# Patient Record
Sex: FEMALE | Race: WHITE | NOT HISPANIC OR LATINO
[De-identification: names, ages, dates, MRNs, and addresses within clinical notes are randomized per-mention and may not be internally consistent; named-entity substitution may affect disease eponyms.]

---

## 2018-07-12 ENCOUNTER — APPOINTMENT (OUTPATIENT)
Dept: SPINE | Facility: CLINIC | Age: 68
End: 2018-07-12
Payer: MEDICARE

## 2018-07-12 VITALS
WEIGHT: 170 LBS | SYSTOLIC BLOOD PRESSURE: 140 MMHG | DIASTOLIC BLOOD PRESSURE: 70 MMHG | HEIGHT: 62 IN | BODY MASS INDEX: 31.28 KG/M2

## 2018-07-12 DIAGNOSIS — M54.5 LOW BACK PAIN: ICD-10-CM

## 2018-07-12 DIAGNOSIS — M54.16 RADICULOPATHY, LUMBAR REGION: ICD-10-CM

## 2018-07-12 PROCEDURE — 99213 OFFICE O/P EST LOW 20 MIN: CPT

## 2018-08-16 ENCOUNTER — APPOINTMENT (OUTPATIENT)
Dept: SPINE | Facility: CLINIC | Age: 68
End: 2018-08-16

## 2018-10-31 ENCOUNTER — APPOINTMENT (OUTPATIENT)
Dept: HEART AND VASCULAR | Facility: CLINIC | Age: 68
End: 2018-10-31
Payer: MEDICARE

## 2018-10-31 VITALS — HEART RATE: 60 BPM

## 2018-10-31 DIAGNOSIS — Z87.39 PERSONAL HISTORY OF OTHER DISEASES OF THE MUSCULOSKELETAL SYSTEM AND CONNECTIVE TISSUE: ICD-10-CM

## 2018-10-31 DIAGNOSIS — Z82.5 FAMILY HISTORY OF ASTHMA AND OTHER CHRONIC LOWER RESPIRATORY DISEASES: ICD-10-CM

## 2018-10-31 DIAGNOSIS — I51.7 CARDIOMEGALY: ICD-10-CM

## 2018-10-31 DIAGNOSIS — F17.200 NICOTINE DEPENDENCE, UNSPECIFIED, UNCOMPLICATED: ICD-10-CM

## 2018-10-31 PROCEDURE — 93306 TTE W/DOPPLER COMPLETE: CPT

## 2018-10-31 PROCEDURE — 93000 ELECTROCARDIOGRAM COMPLETE: CPT

## 2018-10-31 PROCEDURE — 99204 OFFICE O/P NEW MOD 45 MIN: CPT | Mod: 25

## 2019-10-16 ENCOUNTER — APPOINTMENT (OUTPATIENT)
Dept: HEART AND VASCULAR | Facility: CLINIC | Age: 69
End: 2019-10-16
Payer: MEDICARE

## 2019-10-16 VITALS
DIASTOLIC BLOOD PRESSURE: 70 MMHG | WEIGHT: 170 LBS | SYSTOLIC BLOOD PRESSURE: 130 MMHG | BODY MASS INDEX: 31.09 KG/M2 | HEART RATE: 64 BPM

## 2019-10-16 PROCEDURE — 93000 ELECTROCARDIOGRAM COMPLETE: CPT

## 2019-10-16 PROCEDURE — 99214 OFFICE O/P EST MOD 30 MIN: CPT | Mod: 25

## 2019-10-16 NOTE — DISCUSSION/SUMMARY
[FreeTextEntry1] : EKG:NSR,ST abn(no change)\par reviewed labs done Sept(non fasting): SR=599ku%,LDL=70mg%\par dm f/u with PCP; cont Zocor for lipids;stop smoking!;\par cont Altace+HCTZ for hptn

## 2019-10-16 NOTE — REVIEW OF SYSTEMS
[Fever] : no fever [Headache] : no headache [Recent Weight Gain (___ Lbs)] : no recent weight gain [Chills] : no chills [Feeling Fatigued] : not feeling fatigued [Recent Weight Loss (___ Lbs)] : no recent weight loss [Blurry Vision] : no blurred vision [Eyeglasses] : currently wearing eyeglasses [Scratchy Eyes] : scratchy eyes [Shortness Of Breath] : no shortness of breath [Dyspnea on exertion] : not dyspnea during exertion [Chest  Pressure] : no chest pressure [Chest Pain] : no chest pain [Lower Ext Edema] : no extremity edema [Leg Claudication] : no intermittent leg claudication [Palpitations] : no palpitations [Cough] : no cough [Wheezing] : wheezing [Dysuria] : no dysuria [see HPI] : see HPI [Joint Pain] : joint pain [Knee Problem] : knee problems [Negative] : Heme/Lymph

## 2021-08-04 ENCOUNTER — OUTPATIENT (OUTPATIENT)
Dept: OUTPATIENT SERVICES | Facility: HOSPITAL | Age: 71
LOS: 1 days | End: 2021-08-04
Payer: MEDICARE

## 2021-08-04 VITALS
WEIGHT: 151.02 LBS | RESPIRATION RATE: 18 BRPM | DIASTOLIC BLOOD PRESSURE: 78 MMHG | HEIGHT: 61 IN | SYSTOLIC BLOOD PRESSURE: 162 MMHG | OXYGEN SATURATION: 96 % | HEART RATE: 52 BPM | TEMPERATURE: 98 F

## 2021-08-04 DIAGNOSIS — Z01.818 ENCOUNTER FOR OTHER PREPROCEDURAL EXAMINATION: ICD-10-CM

## 2021-08-04 DIAGNOSIS — E11.9 TYPE 2 DIABETES MELLITUS WITHOUT COMPLICATIONS: ICD-10-CM

## 2021-08-04 DIAGNOSIS — Z98.890 OTHER SPECIFIED POSTPROCEDURAL STATES: Chronic | ICD-10-CM

## 2021-08-04 DIAGNOSIS — N20.0 CALCULUS OF KIDNEY: ICD-10-CM

## 2021-08-04 DIAGNOSIS — I10 ESSENTIAL (PRIMARY) HYPERTENSION: ICD-10-CM

## 2021-08-04 DIAGNOSIS — Z98.89 OTHER SPECIFIED POSTPROCEDURAL STATES: Chronic | ICD-10-CM

## 2021-08-04 DIAGNOSIS — Z98.41 CATARACT EXTRACTION STATUS, RIGHT EYE: Chronic | ICD-10-CM

## 2021-08-04 LAB
A1C WITH ESTIMATED AVERAGE GLUCOSE RESULT: 6.4 % — HIGH (ref 4–5.6)
ANION GAP SERPL CALC-SCNC: 10 MMOL/L — SIGNIFICANT CHANGE UP (ref 5–17)
BUN SERPL-MCNC: 13 MG/DL — SIGNIFICANT CHANGE UP (ref 7–23)
CALCIUM SERPL-MCNC: 9.8 MG/DL — SIGNIFICANT CHANGE UP (ref 8.4–10.5)
CHLORIDE SERPL-SCNC: 101 MMOL/L — SIGNIFICANT CHANGE UP (ref 96–108)
CO2 SERPL-SCNC: 27 MMOL/L — SIGNIFICANT CHANGE UP (ref 22–31)
CREAT SERPL-MCNC: 0.55 MG/DL — SIGNIFICANT CHANGE UP (ref 0.5–1.3)
ESTIMATED AVERAGE GLUCOSE: 137 MG/DL — HIGH (ref 68–114)
GLUCOSE SERPL-MCNC: 74 MG/DL — SIGNIFICANT CHANGE UP (ref 70–99)
HCT VFR BLD CALC: 43.7 % — SIGNIFICANT CHANGE UP (ref 34.5–45)
HGB BLD-MCNC: 13.6 G/DL — SIGNIFICANT CHANGE UP (ref 11.5–15.5)
MCHC RBC-ENTMCNC: 26.4 PG — LOW (ref 27–34)
MCHC RBC-ENTMCNC: 31.1 GM/DL — LOW (ref 32–36)
MCV RBC AUTO: 84.7 FL — SIGNIFICANT CHANGE UP (ref 80–100)
NRBC # BLD: 0 /100 WBCS — SIGNIFICANT CHANGE UP (ref 0–0)
PLATELET # BLD AUTO: 242 K/UL — SIGNIFICANT CHANGE UP (ref 150–400)
POTASSIUM SERPL-MCNC: 3.9 MMOL/L — SIGNIFICANT CHANGE UP (ref 3.5–5.3)
POTASSIUM SERPL-SCNC: 3.9 MMOL/L — SIGNIFICANT CHANGE UP (ref 3.5–5.3)
RBC # BLD: 5.16 M/UL — SIGNIFICANT CHANGE UP (ref 3.8–5.2)
RBC # FLD: 17.1 % — HIGH (ref 10.3–14.5)
SODIUM SERPL-SCNC: 138 MMOL/L — SIGNIFICANT CHANGE UP (ref 135–145)
WBC # BLD: 9.78 K/UL — SIGNIFICANT CHANGE UP (ref 3.8–10.5)
WBC # FLD AUTO: 9.78 K/UL — SIGNIFICANT CHANGE UP (ref 3.8–10.5)

## 2021-08-04 PROCEDURE — 87086 URINE CULTURE/COLONY COUNT: CPT

## 2021-08-04 PROCEDURE — 85027 COMPLETE CBC AUTOMATED: CPT

## 2021-08-04 PROCEDURE — G0463: CPT

## 2021-08-04 PROCEDURE — 83036 HEMOGLOBIN GLYCOSYLATED A1C: CPT

## 2021-08-04 PROCEDURE — 80048 BASIC METABOLIC PNL TOTAL CA: CPT

## 2021-08-04 RX ORDER — SODIUM CHLORIDE 9 MG/ML
3 INJECTION INTRAMUSCULAR; INTRAVENOUS; SUBCUTANEOUS EVERY 8 HOURS
Refills: 0 | Status: DISCONTINUED | OUTPATIENT
Start: 2021-08-11 | End: 2021-08-11

## 2021-08-04 RX ORDER — LIDOCAINE HCL 20 MG/ML
0.2 VIAL (ML) INJECTION ONCE
Refills: 0 | Status: DISCONTINUED | OUTPATIENT
Start: 2021-08-11 | End: 2021-08-26

## 2021-08-04 NOTE — H&P PST ADULT - NSICDXPROBLEM_GEN_ALL_CORE_FT
PROBLEM DIAGNOSES  Problem: Calculus of kidney  Assessment and Plan: Pt is scheduled to have a left ESWL on 8/11/21 with Dr. Morales  CBC, BMP, HGA1C, Urine Culture obtained and sent to lab  Pending medical evaluation    Problem: Type 2 diabetes mellitus  Assessment and Plan: Advised pt to hold metformin and glimepiride DOS. Last doses 8/10/21 in the morning.    Problem: Hypertension  Assessment and Plan: Advised pt to take anti-hypertensive medications and hydrochlorothiazide DOS       PROBLEM DIAGNOSES  Problem: Calculus of kidney  Assessment and Plan: Pt is scheduled to have a left ESWL on 8/11/21 with Dr. Morales  CBC, BMP, HGA1C, Urine Culture obtained and sent to lab  KUB Xray ordered DOS  Pending medical evaluation    Problem: Type 2 diabetes mellitus  Assessment and Plan: Advised pt to hold metformin and glimepiride DOS. Last doses 8/10/21 in the morning.    Problem: Hypertension  Assessment and Plan: Advised pt to take anti-hypertensive medications and hydrochlorothiazide DOS       PROBLEM DIAGNOSES  Problem: Calculus of kidney  Assessment and Plan: Pt is scheduled to have a left ESWL on 8/11/21 with Dr. Morales  CBC, BMP, HGA1C, Urine Culture obtained and sent to lab  KUB Xray ordered DOS  Check fingerstick DOS   Pending medical evaluationon     Problem: Type 2 diabetes mellitus  Assessment and Plan: Advised pt to hold metformin and glimepiride DOS. Last doses 8/10/21 in the morning.    Problem: Hypertension  Assessment and Plan: Advised pt to take anti-hypertensive medications and hydrochlorothiazide DOS

## 2021-08-04 NOTE — H&P PST ADULT - NSICDXPASTMEDICALHX_GEN_ALL_CORE_FT
PAST MEDICAL HISTORY:  Depression     Diabetes mellitus Type 2 (on Metformin and Glimepiride)    Herniated lumbar intervertebral disc     HTN (hypertension)     Hyperlipidemia     Paralytic ileus 3-4 years ago, spontaneous - hospitalized with NGT for decompression; resolved on it owns own    Renal calculi     Spinal stenosis s/p "back surgery"

## 2021-08-04 NOTE — H&P PST ADULT - VISION (WITH CORRECTIVE LENSES IF THE PATIENT USUALLY WEARS THEM):
Bifocal glasses/Partially impaired: cannot see medication labels or newsprint, but can see obstacles in path, and the surrounding layout; can count fingers at arm's length

## 2021-08-04 NOTE — H&P PST ADULT - NSICDXPASTSURGICALHX_GEN_ALL_CORE_FT
PAST SURGICAL HISTORY:  H/O arthroscopy of left knee ~15 years ago; Repair of the left meniscus    H/O bilateral cataract extraction ~5 years ago    H/O cystoscopy Pt reports hx of cystoscopy with stent placement "years ago"    Previous back surgery as per patient, "they shaved a bone in my back." ~4-5 years ago with Dr. Gracia

## 2021-08-04 NOTE — H&P PST ADULT - LIVES WITH, PROFILE
Denies known Latex allergy or symptoms of Latex sensitivity  Tobacco use verified.  Medications verified, no changes.      Huong CAMPUZANO Mike is a 69 year old female presenting for a follow up for UCTD, last office visit was 6/8/2020.    - no concerns  - last eye exam June 2020         alone

## 2021-08-04 NOTE — H&P PST ADULT - HISTORY OF PRESENT ILLNESS
66 year old F with PMH of HTN, DM2 (On metformin BID & Glimepiride), current every day smoker (1 ppd) presented to PCP with c/o intermittent lower abdominal pain (not aggravated by eating) and left flank pain accompanied by nausea x 3 days. Pt reports she was sent for an abdominal sonogram which revealed a kidney stone. Pt presents to PST for scheduled left ESWL on 8/11/2021.    Covid vaccine 2nd dose Moderna 1/28/2021  Denies Recent travel, Exposure or Covid symptoms

## 2021-08-05 LAB
CULTURE RESULTS: SIGNIFICANT CHANGE UP
SPECIMEN SOURCE: SIGNIFICANT CHANGE UP

## 2021-08-09 ENCOUNTER — OUTPATIENT (OUTPATIENT)
Dept: OUTPATIENT SERVICES | Facility: HOSPITAL | Age: 71
LOS: 1 days | End: 2021-08-09
Payer: MEDICARE

## 2021-08-09 DIAGNOSIS — Z98.41 CATARACT EXTRACTION STATUS, RIGHT EYE: Chronic | ICD-10-CM

## 2021-08-09 DIAGNOSIS — Z98.89 OTHER SPECIFIED POSTPROCEDURAL STATES: Chronic | ICD-10-CM

## 2021-08-09 DIAGNOSIS — Z98.890 OTHER SPECIFIED POSTPROCEDURAL STATES: Chronic | ICD-10-CM

## 2021-08-09 DIAGNOSIS — Z11.52 ENCOUNTER FOR SCREENING FOR COVID-19: ICD-10-CM

## 2021-08-09 PROBLEM — N20.0 CALCULUS OF KIDNEY: Chronic | Status: ACTIVE | Noted: 2021-08-04

## 2021-08-09 PROBLEM — K56.0 PARALYTIC ILEUS: Chronic | Status: ACTIVE | Noted: 2021-08-04

## 2021-08-09 PROBLEM — E78.5 HYPERLIPIDEMIA, UNSPECIFIED: Chronic | Status: ACTIVE | Noted: 2021-08-04

## 2021-08-09 LAB — SARS-COV-2 RNA SPEC QL NAA+PROBE: SIGNIFICANT CHANGE UP

## 2021-08-10 ENCOUNTER — TRANSCRIPTION ENCOUNTER (OUTPATIENT)
Age: 71
End: 2021-08-10

## 2021-08-11 ENCOUNTER — TRANSCRIPTION ENCOUNTER (OUTPATIENT)
Age: 71
End: 2021-08-11

## 2021-08-11 ENCOUNTER — OUTPATIENT (OUTPATIENT)
Dept: INPATIENT UNIT | Facility: HOSPITAL | Age: 71
LOS: 1 days | End: 2021-08-11
Payer: MEDICARE

## 2021-08-11 VITALS
WEIGHT: 151.02 LBS | TEMPERATURE: 99 F | OXYGEN SATURATION: 99 % | SYSTOLIC BLOOD PRESSURE: 195 MMHG | RESPIRATION RATE: 18 BRPM | HEART RATE: 67 BPM | DIASTOLIC BLOOD PRESSURE: 74 MMHG | HEIGHT: 60.98 IN

## 2021-08-11 VITALS
HEART RATE: 65 BPM | RESPIRATION RATE: 17 BRPM | OXYGEN SATURATION: 95 % | DIASTOLIC BLOOD PRESSURE: 83 MMHG | SYSTOLIC BLOOD PRESSURE: 155 MMHG

## 2021-08-11 DIAGNOSIS — N20.0 CALCULUS OF KIDNEY: ICD-10-CM

## 2021-08-11 DIAGNOSIS — Z98.89 OTHER SPECIFIED POSTPROCEDURAL STATES: Chronic | ICD-10-CM

## 2021-08-11 DIAGNOSIS — Z98.890 OTHER SPECIFIED POSTPROCEDURAL STATES: Chronic | ICD-10-CM

## 2021-08-11 DIAGNOSIS — Z98.41 CATARACT EXTRACTION STATUS, RIGHT EYE: Chronic | ICD-10-CM

## 2021-08-11 LAB — GLUCOSE BLDC GLUCOMTR-MCNC: 131 MG/DL — HIGH (ref 70–99)

## 2021-08-11 PROCEDURE — 74018 RADEX ABDOMEN 1 VIEW: CPT | Mod: 26

## 2021-08-11 PROCEDURE — U0003: CPT

## 2021-08-11 PROCEDURE — 82962 GLUCOSE BLOOD TEST: CPT

## 2021-08-11 PROCEDURE — U0005: CPT

## 2021-08-11 PROCEDURE — 74018 RADEX ABDOMEN 1 VIEW: CPT

## 2021-08-11 PROCEDURE — 76000 FLUOROSCOPY <1 HR PHYS/QHP: CPT

## 2021-08-11 PROCEDURE — C9803: CPT

## 2021-08-11 PROCEDURE — 50590 FRAGMENTING OF KIDNEY STONE: CPT | Mod: LT

## 2021-08-11 RX ORDER — FENTANYL CITRATE 50 UG/ML
25 INJECTION INTRAVENOUS
Refills: 0 | Status: DISCONTINUED | OUTPATIENT
Start: 2021-08-11 | End: 2021-08-11

## 2021-08-11 RX ORDER — SIMVASTATIN 20 MG/1
1 TABLET, FILM COATED ORAL
Qty: 0 | Refills: 0 | DISCHARGE

## 2021-08-11 RX ORDER — ONDANSETRON 8 MG/1
4 TABLET, FILM COATED ORAL ONCE
Refills: 0 | Status: DISCONTINUED | OUTPATIENT
Start: 2021-08-11 | End: 2021-08-11

## 2021-08-11 RX ORDER — SODIUM CHLORIDE 9 MG/ML
1000 INJECTION, SOLUTION INTRAVENOUS
Refills: 0 | Status: DISCONTINUED | OUTPATIENT
Start: 2021-08-11 | End: 2021-08-11

## 2021-08-11 NOTE — DISCHARGE NOTE NURSING/CASE MANAGEMENT/SOCIAL WORK - PATIENT PORTAL LINK FT
You can access the FollowMyHealth Patient Portal offered by [facility] by registering at the following website: [email or web address]. By joining [facility]’s FollowMyHealth portal, you will also be able to view your health information using other applications (apps) compatible with our system.

## 2021-08-11 NOTE — ASU DISCHARGE PLAN (ADULT/PEDIATRIC) - ASU DC SPECIAL INSTRUCTIONSFT
strain urine  lots lots of water  e rx to pharmacy for tamsulosin and ketorolac  call any questions  office follow up 4-6 weeks to check symptoms consider 2nd stage additional proceedure needed

## 2021-08-11 NOTE — PRE-ANESTHESIA EVALUATION ADULT - NSANTHVITALSIGNSFT_GEN_ALL_CORE
Vital Signs Last 24 Hrs  T(C): 37 (11 Aug 2021 13:16), Max: 37 (11 Aug 2021 13:16)  T(F): 98.6 (11 Aug 2021 13:16), Max: 98.6 (11 Aug 2021 13:16)  HR: 67 (11 Aug 2021 13:16) (67 - 67)  BP: 195/74 (11 Aug 2021 13:16) (195/74 - 195/74)  BP(mean): --  RR: 18 (11 Aug 2021 13:16) (18 - 18)  SpO2: 95% (11 Aug 2021 13:16) (95% - 95%)

## 2021-08-11 NOTE — PRE-ANESTHESIA EVALUATION ADULT - NSANTHPMHFT_GEN_ALL_CORE
71F PMH: HTN, HLD, CASSI ( Cpap- non-compliant) DM, Smoker 1 ppd. Patient denies: Angina & Dyspnea on rest or exertion, Denies Severe Regurgitation/Heart burn.

## 2022-07-25 NOTE — H&P PST ADULT - LYMPH NODES
[facility]   [address]   Phone: [phone]   Fax: [phone]    Physical Therapy Daily Treatment Note      Date:  2022  Patient Name:  Eunice Schuster \"Kiara\"   :  1951  MRN: [id number]  Physician: Heather Cantu MD                 Insurance: [employer]. Auth:  Eval + 12 visits through 22  Medical Diagnosis:   M54.42, M54.41, G89.29 (ICD-10-CM) - Chronic bilateral low back pain with bilateral sciatica   E66.01, Z68.41 (ICD-10-CM) - Morbid obesity with BMI of 40.0-44.9, adult ([facility] [place] [facility]   M47.817 (ICD-10-CM) - Lumbosacral spondylosis without myelopathy      Rehab Codes: M54.42, M54.41, R53.1, R26.9  Onset Date:                                Next 's appt: 22  Visit# / total visits:   Cancels/No Shows: 1/0    Precautions: Fall risk, requires SBA for safety for all standing tasks. Monitor SpO2 with exertion (pt has COPD and emphysema) and try to maintain at 90% or above through all activities. Subjective:    Pain:  [] Yes  [x] No Location:  Pain Rating: (0-10 scale) 0/10  Pain altered Tx:  [] No  [] Yes  Action:  Comments: : Patient presents to PT reporting high blood sugar levels the past 2 days. At home around 1 pm today her blood sugar was 572, in the waiting room it was 427, and at 3:32pm it was 363. She consumed toast, coffee, and lots of water around 10 am, and has not consumed any snacks since then. Her blood sugar decreased gradually because she took 3 fast acting insulin shots at home. She reports experiencing increased thirst, glossed vision, \"woozy\" feeling, and spots in vision. Pt reports she had difficulty maintaining healthy blood sugar levels the past 3 months, specifically since started her new medication, Trulicity. However her \"normal\" level is around 140. She had chest pain earlier this morning but did not take a Nitrol.  Her chest pain has gotten more severe lately, she took a Nitrol on Thursday night while playing cards. Pt also believes she has a bladder infection that began last week, however she has not sought any medical attention and is not taking antibiotics. Pt reports she has been more stressed lately, which may be contributing to her health. Objective:  INTERVENTIONS  Reps/ Time Weight/ Level Completed  Today Comments          SpO2/HR: SpO2: HR:     Beginnin% 80bpm     Durin-92%  bpm  Allowed pt to recover to 90% or above before proceeding to next exercise   End: 92% 80bpm            EXERCISES        Seated:       STS 2x5   1st set BUE support, 2nd set 1 UE support   Seated PPT 10x2 5\" hold      Seated Paloff Press 15x2 Green     LAQ 15x2 3\" hold      Marches 15x  Red  RLE only   Hip Adduction isometric with ball 20x 3\" hold      Hip Abduction 10x2 Red     Hamstring Curls 20x ea Red     PPT with alternating UE extensions 10x2  Red     PPT with alternating UE overhead reaches 10x2             Standing:       EO NBOS  30''      EC NBOS 30''x2      NBOS with head turns 2x30''      Modified Tandem stance 30''x2      Balloon taps 2'   CGA, other staff member tapping balloon   Cone retrieval with 2WW 100ft x 6cones   CGA, challenging change of COG   Mini squats 10x   No UE support   Marching on foam 30x   1UE support, CGA   Hamstring curls       Forward Step Ups 10x 6\"     3-Way Hip 15x ea             Patient education, vitals assessment, and monitoring pt sx 40' SpO2 93% and blood sugar 363 at 3:32pm x    Other:     Specific Instructions for next treatment Emphasis on functional, progressive B LE strengthening and endurance as appropriate. Incorporate both static and dynamic balance interventions as appropriate. Monitor exertion response and SpO2, adjusting intensity as needed. Assessment:       [x] Progressing toward goals. [] No change.      [] Other:    [x] Patient would continue to benefit from skilled physical therapy services in order to: help modulate pain and spasms in B legs, improve B LE strength and functional endurance necessary to maximize independence and safety through all ADL and community activities. 7/25: Held all exercise based interventions today as patient arrived with very high blood sugar levels and symptoms associated with it. Spent 40 minutes total assessing patient vitals, monitoring patient symptoms, educating patient and collaborating with EMS upon arrival. Educated patient on the importance of following up with medical team when she experiences unusual symptoms, continuing to assess her vitals regularly, and to keep resources to do so on her at all times. Discussed patient presentation with patient's PCP via phone and was advised by on-site physician that pt should go for immediate ED consult. Called 911 per physician instruction and pt was left in the hands of EMS for transport to [facility]. GOALS:   STG: (to be met in 6 treatments)  Pt will self report worst pain no greater than 3/10 in order to better tolerate ADLs/work activities with minimal dysfunction  Pt will complete FTSTS in <20 seconds with minimal UE support or less maintaining SpO2 at 90% or greater to show improved efficiency with functional transitions in home and reduced fall risk  LTG: (to be met in 12 treatments)  Pt will demonstrate improved B LE strength to 4/5 or greater in order to demonstrate improved stability/strength necessary for unrestricted ADLs/work activities  Pt will complete TUG in <20 seconds with 2WW maintaining SpO2 at 90% or greater to show improved safety and efficiency ambulating in home with reduced risk for falls  Pt will decrease mod SUSAN to 20% impaired or less in order to demonstrate improved functional tolerances at PLOF with minimal restriction/dysfunction  Pt will demonstrate independence with a long term HEP for continued progress/maintenance after completion of PT      Pt.  Education:  [] Yes  [x] No  [] Reviewed Prior HEP/Ed  Method of Education: [x] Verbal  [x] Demo  [] Written  Comprehension of Education:  [x] Verbalizes understanding. [x] Demonstrates understanding. [] Needs review. [] Demonstrates/verbalizes HEP/Ed previously given. Plan: [x] Continue per plan of care. [] Other:      Treatment Charges: Mins Units   []  Modalities     []  Ther Exercise     []  Manual Therapy     [x]  Ther Activities 15 1   []  Aquatics     []  Neuromuscular     [] Vasocompression     [] Gait Training     [] Dry needling        [] 1 or 2 muscles        [] 3 or more muscles     []  Other     Total Treatment time 15 1   *15 min billed for direct 1:1 assessment, monitoring and education.  Pt's remaining time in clinic was spent collaborating/communicating with EMS upon arrival.     Time In: 3:30 pm           Time Out: 4:10 pm    Electronically signed by:  TANIYA Sandoval  All above treatment interventions and documentation were performed by a student physical therapist (SPT) under the direct supervision of licensed provider Fela Martinez PT, DPT No lymphadedenopathy

## 2022-12-13 NOTE — ASU DISCHARGE PLAN (ADULT/PEDIATRIC) - CALL YOUR DOCTOR IF YOU HAVE ANY OF THE FOLLOWING:
Results and orders sent to [facility]. Office Yvonne Quinonez has been rescheduled to next week.    Bleeding that does not stop

## 2023-08-03 ENCOUNTER — NON-APPOINTMENT (OUTPATIENT)
Age: 73
End: 2023-08-03

## 2023-08-03 ENCOUNTER — APPOINTMENT (OUTPATIENT)
Dept: CARDIOLOGY | Facility: CLINIC | Age: 73
End: 2023-08-03
Payer: MEDICARE

## 2023-08-03 VITALS
SYSTOLIC BLOOD PRESSURE: 139 MMHG | OXYGEN SATURATION: 97 % | HEART RATE: 75 BPM | HEIGHT: 62 IN | BODY MASS INDEX: 26.61 KG/M2 | TEMPERATURE: 97.2 F | WEIGHT: 144.6 LBS | DIASTOLIC BLOOD PRESSURE: 57 MMHG

## 2023-08-03 PROCEDURE — 99205 OFFICE O/P NEW HI 60 MIN: CPT

## 2023-08-03 PROCEDURE — 93000 ELECTROCARDIOGRAM COMPLETE: CPT

## 2023-08-03 RX ORDER — ASPIRIN ENTERIC COATED TABLETS 81 MG 81 MG/1
81 TABLET, DELAYED RELEASE ORAL DAILY
Qty: 90 | Refills: 1 | Status: ACTIVE | COMMUNITY
Start: 2023-08-03 | End: 1900-01-01

## 2023-08-03 RX ORDER — LOSARTAN POTASSIUM 50 MG/1
50 TABLET, FILM COATED ORAL
Qty: 90 | Refills: 3 | Status: ACTIVE | COMMUNITY
Start: 2023-08-03 | End: 1900-01-01

## 2023-08-03 NOTE — HISTORY OF PRESENT ILLNESS
[FreeTextEntry1] : POLLO WATKINS 73 year F BMI 27 , presents with HTN HLD COPD centrally cyanosed abnormal EKG NYHA 2-3 dyspnea, occasional non-exertional chest pain, no palpitations, no syncope, no claudication, no peripheral edema.   Tobacco Active. Smoking cessation counselled. /57; HR 75 BPM. Echo lexiscan nuc for re=sting STT anomalies on EKG. RTC 6w. Rationalize meds stop simvastatin (age) ramipril, amlodipine, HCTZ for losartan 50/d, continue metoprolol 50/d, ASA  81. Total visit time 65min. Recent [facility] admit for BP control. 8/3/23 NSR 63/min TWI/ST depression V4-6

## 2023-08-03 NOTE — PHYSICAL EXAM

## 2023-08-04 ENCOUNTER — APPOINTMENT (OUTPATIENT)
Dept: CARDIOLOGY | Facility: CLINIC | Age: 73
End: 2023-08-04
Payer: MEDICARE

## 2023-08-04 PROCEDURE — 93306 TTE W/DOPPLER COMPLETE: CPT

## 2023-08-05 LAB
ALBUMIN SERPL ELPH-MCNC: 4.3 G/DL
ALP BLD-CCNC: 79 U/L
ALT SERPL-CCNC: 11 U/L
ANION GAP SERPL CALC-SCNC: 13 MMOL/L
AST SERPL-CCNC: 11 U/L
BILIRUB SERPL-MCNC: 0.2 MG/DL
BUN SERPL-MCNC: 17 MG/DL
CALCIUM SERPL-MCNC: 9.9 MG/DL
CHLORIDE SERPL-SCNC: 103 MMOL/L
CHOLEST SERPL-MCNC: 141 MG/DL
CO2 SERPL-SCNC: 27 MMOL/L
CREAT SERPL-MCNC: 0.54 MG/DL
EGFR: 96 ML/MIN/1.73M2
GLUCOSE SERPL-MCNC: 99 MG/DL
HDLC SERPL-MCNC: 35 MG/DL
LDLC SERPL CALC-MCNC: 73 MG/DL
NONHDLC SERPL-MCNC: 106 MG/DL
NT-PROBNP SERPL-MCNC: 231 PG/ML
POTASSIUM SERPL-SCNC: 4.4 MMOL/L
PROT SERPL-MCNC: 6.4 G/DL
SODIUM SERPL-SCNC: 143 MMOL/L
TRIGL SERPL-MCNC: 191 MG/DL

## 2023-08-10 ENCOUNTER — APPOINTMENT (OUTPATIENT)
Dept: CARDIOLOGY | Facility: CLINIC | Age: 73
End: 2023-08-10

## 2023-10-11 ENCOUNTER — APPOINTMENT (OUTPATIENT)
Dept: HEART AND VASCULAR | Facility: CLINIC | Age: 73
End: 2023-10-11

## 2023-10-11 ENCOUNTER — APPOINTMENT (OUTPATIENT)
Dept: CARDIOLOGY | Facility: CLINIC | Age: 73
End: 2023-10-11
Payer: MEDICARE

## 2023-10-11 VITALS
WEIGHT: 147 LBS | SYSTOLIC BLOOD PRESSURE: 124 MMHG | DIASTOLIC BLOOD PRESSURE: 67 MMHG | TEMPERATURE: 97.1 F | HEART RATE: 82 BPM | OXYGEN SATURATION: 93 % | BODY MASS INDEX: 27.05 KG/M2 | HEIGHT: 62 IN

## 2023-10-11 DIAGNOSIS — R94.31 ABNORMAL ELECTROCARDIOGRAM [ECG] [EKG]: ICD-10-CM

## 2023-10-11 DIAGNOSIS — R07.9 CHEST PAIN, UNSPECIFIED: ICD-10-CM

## 2023-10-11 PROCEDURE — 99214 OFFICE O/P EST MOD 30 MIN: CPT

## 2023-11-01 ENCOUNTER — APPOINTMENT (OUTPATIENT)
Dept: CARDIOLOGY | Facility: CLINIC | Age: 73
End: 2023-11-01
Payer: MEDICARE

## 2023-11-01 PROCEDURE — A9500: CPT

## 2023-11-01 PROCEDURE — 93015 CV STRESS TEST SUPVJ I&R: CPT

## 2023-11-01 PROCEDURE — 78452 HT MUSCLE IMAGE SPECT MULT: CPT

## 2023-11-08 ENCOUNTER — APPOINTMENT (OUTPATIENT)
Dept: CARDIOLOGY | Facility: CLINIC | Age: 73
End: 2023-11-08
Payer: MEDICARE

## 2023-11-08 VITALS
HEIGHT: 62 IN | TEMPERATURE: 97.3 F | HEART RATE: 73 BPM | DIASTOLIC BLOOD PRESSURE: 74 MMHG | BODY MASS INDEX: 27.23 KG/M2 | WEIGHT: 148 LBS | SYSTOLIC BLOOD PRESSURE: 162 MMHG | OXYGEN SATURATION: 94 %

## 2023-11-08 DIAGNOSIS — E78.2 MIXED HYPERLIPIDEMIA: ICD-10-CM

## 2023-11-08 DIAGNOSIS — E11.9 TYPE 2 DIABETES MELLITUS W/OUT COMPLICATIONS: ICD-10-CM

## 2023-11-08 DIAGNOSIS — I10 ESSENTIAL (PRIMARY) HYPERTENSION: ICD-10-CM

## 2023-11-08 PROCEDURE — 99214 OFFICE O/P EST MOD 30 MIN: CPT

## 2025-05-12 ENCOUNTER — INPATIENT (INPATIENT)
Facility: HOSPITAL | Age: 75
LOS: 3 days | Discharge: HOME | End: 2025-05-16
Attending: STUDENT IN AN ORGANIZED HEALTH CARE EDUCATION/TRAINING PROGRAM | Admitting: STUDENT IN AN ORGANIZED HEALTH CARE EDUCATION/TRAINING PROGRAM
Payer: MEDICARE

## 2025-05-12 ENCOUNTER — RESULT REVIEW (OUTPATIENT)
Age: 75
End: 2025-05-12

## 2025-05-12 VITALS
HEART RATE: 73 BPM | RESPIRATION RATE: 18 BRPM | TEMPERATURE: 99 F | HEIGHT: 62 IN | OXYGEN SATURATION: 100 % | WEIGHT: 138.89 LBS | SYSTOLIC BLOOD PRESSURE: 129 MMHG | DIASTOLIC BLOOD PRESSURE: 49 MMHG

## 2025-05-12 DIAGNOSIS — K80.50 CALCULUS OF BILE DUCT WITHOUT CHOLANGITIS OR CHOLECYSTITIS WITHOUT OBSTRUCTION: ICD-10-CM

## 2025-05-12 DIAGNOSIS — Z98.89 OTHER SPECIFIED POSTPROCEDURAL STATES: Chronic | ICD-10-CM

## 2025-05-12 DIAGNOSIS — Z98.890 OTHER SPECIFIED POSTPROCEDURAL STATES: Chronic | ICD-10-CM

## 2025-05-12 DIAGNOSIS — Z98.890 OTHER SPECIFIED POSTPROCEDURAL STATES: ICD-10-CM

## 2025-05-12 DIAGNOSIS — Z98.41 CATARACT EXTRACTION STATUS, RIGHT EYE: Chronic | ICD-10-CM

## 2025-05-12 LAB
A1C WITH ESTIMATED AVERAGE GLUCOSE RESULT: 6.4 % — HIGH (ref 4–5.6)
ALBUMIN SERPL ELPH-MCNC: 3.1 G/DL — LOW (ref 3.3–5)
ALP SERPL-CCNC: 732 U/L — HIGH (ref 40–120)
ALT FLD-CCNC: 185 U/L — HIGH (ref 4–33)
ANION GAP SERPL CALC-SCNC: 16 MMOL/L — HIGH (ref 7–14)
APTT BLD: 32.4 SEC — SIGNIFICANT CHANGE UP (ref 26.1–36.8)
AST SERPL-CCNC: 447 U/L — HIGH (ref 4–32)
BILIRUB DIRECT SERPL-MCNC: 1.4 MG/DL — HIGH (ref 0–0.3)
BILIRUB INDIRECT FLD-MCNC: 0.2 MG/DL — SIGNIFICANT CHANGE UP (ref 0–1)
BILIRUB SERPL-MCNC: 1.6 MG/DL — HIGH (ref 0.2–1.2)
BLD GP AB SCN SERPL QL: NEGATIVE — SIGNIFICANT CHANGE UP
BUN SERPL-MCNC: 26 MG/DL — HIGH (ref 7–23)
CALCIUM SERPL-MCNC: 8.1 MG/DL — LOW (ref 8.4–10.5)
CHLORIDE SERPL-SCNC: 100 MMOL/L — SIGNIFICANT CHANGE UP (ref 98–107)
CO2 SERPL-SCNC: 23 MMOL/L — SIGNIFICANT CHANGE UP (ref 22–31)
CREAT SERPL-MCNC: 1.43 MG/DL — HIGH (ref 0.5–1.3)
EGFR: 38 ML/MIN/1.73M2 — LOW
EGFR: 38 ML/MIN/1.73M2 — LOW
ESTIMATED AVERAGE GLUCOSE: 137 — SIGNIFICANT CHANGE UP
GLUCOSE BLDC GLUCOMTR-MCNC: 122 MG/DL — HIGH (ref 70–99)
GLUCOSE BLDC GLUCOMTR-MCNC: 189 MG/DL — HIGH (ref 70–99)
GLUCOSE BLDC GLUCOMTR-MCNC: 195 MG/DL — HIGH (ref 70–99)
GLUCOSE BLDC GLUCOMTR-MCNC: 89 MG/DL — SIGNIFICANT CHANGE UP (ref 70–99)
GLUCOSE SERPL-MCNC: 128 MG/DL — HIGH (ref 70–99)
HCT VFR BLD CALC: 26.6 % — LOW (ref 34.5–45)
HGB BLD-MCNC: 8.8 G/DL — LOW (ref 11.5–15.5)
INR BLD: 1.52 RATIO — HIGH (ref 0.85–1.16)
LACTATE SERPL-SCNC: 1.4 MMOL/L — SIGNIFICANT CHANGE UP (ref 0.5–2)
MAGNESIUM SERPL-MCNC: 2.3 MG/DL — SIGNIFICANT CHANGE UP (ref 1.6–2.6)
MCHC RBC-ENTMCNC: 27.1 PG — SIGNIFICANT CHANGE UP (ref 27–34)
MCHC RBC-ENTMCNC: 33.1 G/DL — SIGNIFICANT CHANGE UP (ref 32–36)
MCV RBC AUTO: 81.8 FL — SIGNIFICANT CHANGE UP (ref 80–100)
MRSA PCR RESULT.: SIGNIFICANT CHANGE UP
NRBC # BLD AUTO: 0 K/UL — SIGNIFICANT CHANGE UP (ref 0–0)
NRBC # FLD: 0 K/UL — SIGNIFICANT CHANGE UP (ref 0–0)
NRBC BLD AUTO-RTO: 0 /100 WBCS — SIGNIFICANT CHANGE UP (ref 0–0)
PHOSPHATE SERPL-MCNC: 2.8 MG/DL — SIGNIFICANT CHANGE UP (ref 2.5–4.5)
PLATELET # BLD AUTO: 398 K/UL — SIGNIFICANT CHANGE UP (ref 150–400)
POTASSIUM SERPL-MCNC: 3.5 MMOL/L — SIGNIFICANT CHANGE UP (ref 3.5–5.3)
POTASSIUM SERPL-SCNC: 3.5 MMOL/L — SIGNIFICANT CHANGE UP (ref 3.5–5.3)
PROT SERPL-MCNC: 6 G/DL — SIGNIFICANT CHANGE UP (ref 6–8.3)
PROTHROM AB SERPL-ACNC: 17.6 SEC — HIGH (ref 9.9–13.4)
RBC # BLD: 3.25 M/UL — LOW (ref 3.8–5.2)
RBC # FLD: 16.9 % — HIGH (ref 10.3–14.5)
RH IG SCN BLD-IMP: POSITIVE — SIGNIFICANT CHANGE UP
S AUREUS DNA NOSE QL NAA+PROBE: SIGNIFICANT CHANGE UP
SODIUM SERPL-SCNC: 139 MMOL/L — SIGNIFICANT CHANGE UP (ref 135–145)
WBC # BLD: 31.3 K/UL — HIGH (ref 3.8–10.5)
WBC # FLD AUTO: 31.3 K/UL — HIGH (ref 3.8–10.5)

## 2025-05-12 PROCEDURE — 76700 US EXAM ABDOM COMPLETE: CPT | Mod: 26

## 2025-05-12 PROCEDURE — 71260 CT THORAX DX C+: CPT | Mod: 26

## 2025-05-12 PROCEDURE — 93306 TTE W/DOPPLER COMPLETE: CPT | Mod: 26

## 2025-05-12 PROCEDURE — 47490 INCISION OF GALLBLADDER: CPT | Mod: GC

## 2025-05-12 PROCEDURE — 99222 1ST HOSP IP/OBS MODERATE 55: CPT | Mod: GC,57

## 2025-05-12 PROCEDURE — 76705 ECHO EXAM OF ABDOMEN: CPT | Mod: 26

## 2025-05-12 PROCEDURE — 71045 X-RAY EXAM CHEST 1 VIEW: CPT | Mod: 26

## 2025-05-12 PROCEDURE — 74177 CT ABD & PELVIS W/CONTRAST: CPT | Mod: 26

## 2025-05-12 RX ORDER — METFORMIN HYDROCHLORIDE 850 MG/1
1 TABLET ORAL
Refills: 0 | DISCHARGE

## 2025-05-12 RX ORDER — HYDROMORPHONE/SOD CHLOR,ISO/PF 2 MG/10 ML
0.5 SYRINGE (ML) INJECTION EVERY 4 HOURS
Refills: 0 | Status: DISCONTINUED | OUTPATIENT
Start: 2025-05-12 | End: 2025-05-13

## 2025-05-12 RX ORDER — HYDROMORPHONE/SOD CHLOR,ISO/PF 2 MG/10 ML
0.2 SYRINGE (ML) INJECTION EVERY 4 HOURS
Refills: 0 | Status: DISCONTINUED | OUTPATIENT
Start: 2025-05-12 | End: 2025-05-13

## 2025-05-12 RX ORDER — ATORVASTATIN CALCIUM 80 MG/1
1 TABLET, FILM COATED ORAL
Refills: 0 | DISCHARGE

## 2025-05-12 RX ORDER — PIPERACILLIN-TAZO-DEXTROSE,ISO 2.25G/50ML
3.38 IV SOLUTION, PIGGYBACK PREMIX FROZEN(ML) INTRAVENOUS ONCE
Refills: 0 | Status: COMPLETED | OUTPATIENT
Start: 2025-05-12 | End: 2025-05-12

## 2025-05-12 RX ORDER — POTASSIUM CHLORIDE, DEXTROSE MONOHYDRATE AND SODIUM CHLORIDE 150; 5; 900 MG/100ML; G/100ML; MG/100ML
1000 INJECTION, SOLUTION INTRAVENOUS
Refills: 0 | Status: DISCONTINUED | OUTPATIENT
Start: 2025-05-12 | End: 2025-05-13

## 2025-05-12 RX ORDER — SERTRALINE 100 MG/1
1 TABLET, FILM COATED ORAL
Refills: 0 | DISCHARGE

## 2025-05-12 RX ORDER — CYST/ALA/Q10/PHOS.SER/DHA/BROC 100-20-50
0 POWDER (GRAM) ORAL
Refills: 0 | DISCHARGE

## 2025-05-12 RX ORDER — INSULIN LISPRO 100 U/ML
INJECTION, SOLUTION INTRAVENOUS; SUBCUTANEOUS EVERY 6 HOURS
Refills: 0 | Status: DISCONTINUED | OUTPATIENT
Start: 2025-05-12 | End: 2025-05-13

## 2025-05-12 RX ORDER — SERTRALINE 100 MG/1
100 TABLET, FILM COATED ORAL DAILY
Refills: 0 | Status: DISCONTINUED | OUTPATIENT
Start: 2025-05-12 | End: 2025-05-16

## 2025-05-12 RX ORDER — LOSARTAN POTASSIUM 100 MG/1
1 TABLET, FILM COATED ORAL
Refills: 0 | DISCHARGE

## 2025-05-12 RX ORDER — ONDANSETRON HCL/PF 4 MG/2 ML
4 VIAL (ML) INJECTION ONCE
Refills: 0 | Status: COMPLETED | OUTPATIENT
Start: 2025-05-12 | End: 2025-05-12

## 2025-05-12 RX ORDER — PIPERACILLIN-TAZO-DEXTROSE,ISO 2.25G/50ML
3.38 IV SOLUTION, PIGGYBACK PREMIX FROZEN(ML) INTRAVENOUS EVERY 8 HOURS
Refills: 0 | Status: DISCONTINUED | OUTPATIENT
Start: 2025-05-12 | End: 2025-05-16

## 2025-05-12 RX ORDER — ASPIRIN 325 MG
1 TABLET ORAL
Refills: 0 | DISCHARGE

## 2025-05-12 RX ORDER — ASPIRIN 325 MG
81 TABLET ORAL DAILY
Refills: 0 | Status: DISCONTINUED | OUTPATIENT
Start: 2025-05-12 | End: 2025-05-16

## 2025-05-12 RX ORDER — SODIUM CHLORIDE 9 G/1000ML
1000 INJECTION, SOLUTION INTRAVENOUS
Refills: 0 | Status: DISCONTINUED | OUTPATIENT
Start: 2025-05-12 | End: 2025-05-12

## 2025-05-12 RX ORDER — HYDROCHLOROTHIAZIDE 50 MG/1
1 TABLET ORAL
Refills: 0 | DISCHARGE

## 2025-05-12 RX ORDER — ENOXAPARIN SODIUM 100 MG/ML
40 INJECTION SUBCUTANEOUS EVERY 24 HOURS
Refills: 0 | Status: DISCONTINUED | OUTPATIENT
Start: 2025-05-12 | End: 2025-05-12

## 2025-05-12 RX ORDER — ATORVASTATIN CALCIUM 80 MG/1
40 TABLET, FILM COATED ORAL AT BEDTIME
Refills: 0 | Status: DISCONTINUED | OUTPATIENT
Start: 2025-05-12 | End: 2025-05-16

## 2025-05-12 RX ORDER — ACETAMINOPHEN 500 MG/5ML
1000 LIQUID (ML) ORAL EVERY 6 HOURS
Refills: 0 | Status: DISCONTINUED | OUTPATIENT
Start: 2025-05-12 | End: 2025-05-13

## 2025-05-12 RX ORDER — METOPROLOL SUCCINATE 50 MG/1
1 TABLET, EXTENDED RELEASE ORAL
Refills: 0 | DISCHARGE

## 2025-05-12 RX ORDER — ACETAMINOPHEN 500 MG/5ML
1000 LIQUID (ML) ORAL EVERY 6 HOURS
Refills: 0 | Status: COMPLETED | OUTPATIENT
Start: 2025-05-12 | End: 2025-05-13

## 2025-05-12 RX ADMIN — SODIUM CHLORIDE 75 MILLILITER(S): 9 INJECTION, SOLUTION INTRAVENOUS at 05:49

## 2025-05-12 RX ADMIN — Medication 81 MILLIGRAM(S): at 11:44

## 2025-05-12 RX ADMIN — Medication 200 GRAM(S): at 05:50

## 2025-05-12 RX ADMIN — INSULIN LISPRO 1: 100 INJECTION, SOLUTION INTRAVENOUS; SUBCUTANEOUS at 06:05

## 2025-05-12 RX ADMIN — Medication 4 MILLIGRAM(S): at 11:47

## 2025-05-12 RX ADMIN — Medication 25 GRAM(S): at 16:00

## 2025-05-12 RX ADMIN — Medication 25 GRAM(S): at 09:26

## 2025-05-12 RX ADMIN — SODIUM CHLORIDE 75 MILLILITER(S): 9 INJECTION, SOLUTION INTRAVENOUS at 09:25

## 2025-05-12 RX ADMIN — Medication 400 MILLIGRAM(S): at 18:18

## 2025-05-12 RX ADMIN — Medication 400 MILLIGRAM(S): at 05:49

## 2025-05-12 RX ADMIN — ATORVASTATIN CALCIUM 40 MILLIGRAM(S): 80 TABLET, FILM COATED ORAL at 21:34

## 2025-05-12 RX ADMIN — Medication 1 APPLICATION(S): at 11:48

## 2025-05-12 RX ADMIN — Medication 25 GRAM(S): at 21:34

## 2025-05-12 RX ADMIN — Medication 400 MILLIGRAM(S): at 11:44

## 2025-05-12 RX ADMIN — INSULIN LISPRO 1: 100 INJECTION, SOLUTION INTRAVENOUS; SUBCUTANEOUS at 12:44

## 2025-05-12 RX ADMIN — Medication 1000 MILLIGRAM(S): at 06:19

## 2025-05-12 NOTE — H&P ADULT - HISTORY OF PRESENT ILLNESS
74yo F with Hx HTN, COPD, DM, and recent BLE stents (on ASA/PVX, started 4/24) who was transferred from [facility] for acute cholecystitis. She presented initially on 5/9 with 1 day of abdominal pain and nausea, found to have leukocytosis (WBC 20.3) and CT findings of a distended, inflamed GB c/f acute cholecystitis. During her hospital admission, her WBC became elevated from 13.6 > 20.3 > 38.6. She was transferred to [facility] for surgical intervention. On presentation to [facility], she was afebrile, hemodynamically stable.

## 2025-05-12 NOTE — PATIENT PROFILE ADULT - FALL HARM RISK - HARM RISK INTERVENTIONS

## 2025-05-12 NOTE — CHART NOTE - NSCHARTNOTEFT_GEN_A_CORE
Surgical Critical Care Point of Care Ultrasound    : Mike Kaminski, PGY-2  Study date and time: 05- @ 05:45.   Indication: Cholecystitis, dyspnea.    Imaging performed and findings    Limited echocardiogram  Views obtained: Parasternal long axis, parasternal short axis, apical four-chamber, sub-xiphoid four-chamber, sub-xiphoid inferior vena cava.   Atria: Normal size and contraction with no thrombi seen.   Ventricles: Normal size and contractility, normal diastolic relaxation.   Septum: Normal size and wall motion.   Valves: Calcified mitral valve, otherwise normal valvular excursion, no vegetations or thrombi seen, no valve prolapse.   Pericardium: No effusion, normal echogenicity.   Inferior vena cava:     Limited abdominal  Liver: Hepatic steatosis, no intrahepatic bile duct dilatation, normal-appearing hepatic vasculature.   Gallbladder and bile ducts: Gallbladder distension and wall thickening with trace pericholecystic fluid. Sludge at the gallbladder neck and questionable cholelith at the proximal infundibulum. Negative sonographic Bourne's sign. Common bile duct 6.4 mm.   Spleen: Not visualized.     Limited retroperitoneal and pelvic  Aorta: No proximal abdominal aortic aneurysm,   Kidneys: Normal right kidney parenchyma.     Interpretation and Clinical Context  - Grossly normal left ventricular ejection fraction (LVEF), no severe diastolic dysfunction, not significantly changed compared with last documented study 8/4/2023 which showed LVEF 55%, grade I diastolic dysfunction, and moderate mitral valve calcification.   - Clinical history includes poor functional status by Duke Activity Status Index (9.95-15.2) and New York Heart Association class III dyspnea. Patient also has known chronic obstructive pulmonary disease associated with a 62 pack-year smoking history (last smoked 5/8)  - Baseline EKG (2023) with ST depressions in leads I, II, and V4-V6, and T-wave inversions in leads V4-V6.  - Given above, will pursue follow-up with formal transthoracic echocardiogram (TTE) and 12-lead EKG to confirm stability of cardiovascular health over past two years.    Images uploaded to [employer]. Surgical Critical Care Point of Care Ultrasound    : Mike Kaminski, PGY-2  Study date and time: 05- @ 05:45.   Indication: Cholecystitis, dyspnea.    Imaging performed and findings    Limited echocardiogram  Views obtained: Parasternal long axis, parasternal short axis, apical four-chamber, sub-xiphoid four-chamber, sub-xiphoid inferior vena cava.   Atria: Normal size and contraction with no thrombi seen.   Ventricles: Normal size and contractility, normal diastolic relaxation.   Septum: Normal size and wall motion.   Valves: Calcified mitral valve, otherwise normal valvular excursion, no vegetations or thrombi seen, no valve prolapse.   Pericardium: No effusion, normal echogenicity.   Inferior vena cava:     Limited abdominal  Liver: Hepatic steatosis, no intrahepatic bile duct dilatation, normal-appearing hepatic vasculature.   Gallbladder and bile ducts: Gallbladder distension and wall thickening with trace pericholecystic fluid. Sludge at the gallbladder neck and questionable cholelith at the proximal infundibulum. Negative sonographic Bourne's sign. Common bile duct 6.4 mm.   Spleen: Not visualized.     Limited retroperitoneal and pelvic  Aorta: No proximal abdominal aortic aneurysm,   Kidneys: Normal right kidney parenchyma.     Interpretation and Clinical Context  - Grossly normal left ventricular ejection fraction (LVEF), no severe diastolic dysfunction, not significantly changed compared with last documented study 8/4/2023 which showed LVEF 55%, grade I diastolic dysfunction, and moderate mitral valve calcification.   - Clinical history includes poor functional status by Duke Activity Status Index (9.95-15.2) and New York Heart Association class III dyspnea. Patient also has known chronic obstructive pulmonary disease associated with a 62 pack-year smoking history (last smoked 5/8)  - Baseline EKG (2023) with ST depressions in leads I, II, and V4-V6, and T-wave inversions in leads V4-V6.  - Given above, will pursue follow-up with formal transthoracic echocardiogram (TTE) and 12-lead EKG to confirm stability of cardiovascular health over past two years.    Images uploaded to [employer].  Agree.

## 2025-05-12 NOTE — PROCEDURE NOTE - PROCEDURE FINDINGS AND DETAILS
Technically successful transperitoneal placement of 8.5Fr perc kris tube with 50 cc of bilious fluid aspirated and sent for cx.

## 2025-05-12 NOTE — H&P ADULT - ATTENDING COMMENTS
Patient with severe acute cholecystitis based on clinical exam, labs and imaging.  plan  iv abx  npo, ivf  vascular eval for stents since on asa plavix  may require ir for perc kris    I have personally interviewed and examined this patient, reviewed pertinent labs and imaging, and discussed the case with colleagues, residents, and physician assistants on B Team rounds.    The active care issues are:  1. acute cholecystitis    The Acute Care Surgery (B Team) Attending Group Practice    urgent issues - spectra [id number]  nonurgent issues - [phone]  patient appointments or afterhours - [phone]

## 2025-05-12 NOTE — PRE PROCEDURE NOTE - PRE PROCEDURE EVALUATION
Interventional Radiology    HPI: 75y Female with acute cholecystitis presents for perc kris    Allergies: No Known Allergies    Medications (Abx/Cardiac/Anticoagulation/Blood Products)  aspirin  chewable: 81 milliGRAM(s) Oral (05-12 @ 11:44)  piperacillin/tazobactam IVPB.: 200 mL/Hr IV Intermittent (05-12 @ 05:50)  piperacillin/tazobactam IVPB.-: 25 mL/Hr IV Intermittent (05-12 @ 09:26)    Data:  157.5  63  T(C): 36.2  HR: 58  BP: 129/46  RR: 12  SpO2: 100%    -WBC 31.30 / HgB 8.8 / Hct 26.6 / Plt 398  -Na 139 / Cl 100 / BUN 26 / Glucose 128  -K 3.5 / CO2 23 / Cr 1.43  - / Alk Phos 732 / T.Bili 1.6  -INR1.52    Imaging: Reviewed    Plan:   75y Female with acute cholecystitis presents for perc kris  -- Relevant imaging and labs were reviewed.   -- Risks, benefits, and alternatives were explained to the patient and informed consent was obtained.

## 2025-05-12 NOTE — PROCEDURE NOTE - PLAN
monitor for signs of sepsis and bleeding  resume plavix in 48 hrs if no signs of bleeding if able to   has been on asa, continue asa as pt with recent stent placement

## 2025-05-12 NOTE — H&P ADULT - ASSESSMENT
76yo F with Hx HTN, COPD, DM, and recent BLE stents (on ASA/PVX, started 4/24) who presented to [facility] on 5/9 with 1 day of abdominal pain and nausea, found to have a distended, inflamed GB c/f acute cholecystitis on CT. Her hospital course c/b rising leukocytosis (WBC 13.6 > 20.3 > 38.6). She was transferred to [facility] for surgical intervention.     PLAN:   - Admit to B Team Surgery, Dr. Alfonso Whitley  - Diet: NPO / IVF resuscitation  - Abx: Zosyn  - Repeat labs  - Formal RUQ u/s pending  - Possible operative intervention, pending whether or not ASA/PVX can be held   - SICU evaluation for hemodynamic monitoring     Discussed with Dr. Gutierrez Dickens, PGY-3  B Team Surgery  #[id number]

## 2025-05-12 NOTE — CONSULT NOTE ADULT - SUBJECTIVE AND OBJECTIVE BOX
SICU Consultation Note  =====================================================  HPI:   75F HTN, COPD, DM, and recent BL LE stents (on ASA/PVX, started 4/24) who was transferred from [facility] for acute cholecystitis. She presented initially on 5/9 with 1 day of abdominal pain and nausea, found to have leukocytosis (WBC 20.3) and CT findings of a distended, inflamed GB c/f acute cholecystitis. During her hospital admission, her WBC became elevated from 13.6 > 20.3 > 38.6. She was transferred to [facility] for surgical intervention. On presentation to [facility], she was afebrile, hemodynamically stable.       Allergies: No Known Allergies    PAST MEDICAL & SURGICAL HISTORY:  HTN (hypertension)      Diabetes mellitus  Type 2 (on Metformin and Glimepiride)      Depression      Spinal stenosis  s/p "back surgery"      Herniated lumbar intervertebral disc      Hyperlipidemia      Paralytic ileus  3-4 years ago, spontaneous - hospitalized with NGT for decompression; resolved on it owns own      Renal calculi      H/O arthroscopy of left knee  ~15 years ago; Repair of the left meniscus      H/O bilateral cataract extraction  ~5 years ago      Previous back surgery  as per patient, "they shaved a bone in my back." ~4-5 years ago with Dr. Gracia      H/O cystoscopy  Pt reports hx of cystoscopy with stent placement "years ago"        FAMILY HISTORY:      SOCIAL HISTORY:      ADVANCE DIRECTIVES: Full Code      REVIEW OF SYSTEMS:  Negative, except as noted in the HPI    HOME MEDICATIONS:   --------------------------------------------------------------------------------------    aspirin 81 mg oral capsule: 1 cap(s) orally once a day (12 May 2025 05:18)  atorvastatin 40 mg oral tablet: 1 tab(s) orally once a day (12 May 2025 05:22)  D3 25 mcg (1000 intl units) oral tablet: 1 tab(s) orally once a day (12 May 2025 05:22)  glimepiride 4 mg oral tablet: 1 tab(s) orally once a day (12 May 2025 05:30)  hydroCHLOROthiazide 25 mg oral tablet: 1 tab(s) orally once a day (12 May 2025 05:22)  losartan 100 mg oral tablet: 1 tab(s) orally once a day (12 May 2025 05:22)  metFORMIN 1000 mg oral tablet, extended release: 1 tab(s) orally once a day (12 May 2025 05:22)  metoprolol succinate 50 mg oral capsule, extended release: 1 cap(s) orally once a day (12 May 2025 05:22)  Plavix 75 mg oral tablet: 1 tab(s) orally once a day (12 May 2025 05:18)  PreserVision AREDS 2 oral capsule: orally once a day (12 May 2025 05:23)  sertraline 100 mg oral tablet: 1 tab(s) orally once a day (12 May 2025 05:22)    --------------------------------------------------------------------------------------      VITAL SIGNS, INS/OUTS (last 24 hours):  --------------------------------------------------------------------------------------    T(C): 37.3 (05-12-25 @ 04:45), Max: 37.3 (05-12-25 @ 04:45)  T(F): 99.1 (05-12-25 @ 04:45), Max: 99.1 (05-12-25 @ 04:45)  HR: 70 (05-12-25 @ 05:00) (70 - 73)  BP: 131/50 (05-12-25 @ 05:00) (129/49 - 131/50)  BP(mean): 74 (05-12-25 @ 05:00) (70 - 74)  RR: 18 (05-12-25 @ 05:00) (18 - 18)  SpO2: 100% (05-12-25 @ 05:00) (100% - 100%)    --------------------------------------------------------------------------------------    EXAM  NEUROLOGY  Exam: Normal, NAD, alert, oriented x 3, no focal deficits    HEENT  Exam: Normocephalic, atraumatic, EOMI, anicteric    RESPIRATORY  Exam: Lungs clear to auscultation, Normal expansion/effort    CARDIOVASCULAR  Exam: Regular rate, normotensive, not on pressors    GI/NUTRITION  Exam: Abdomen soft, non-distended, severely tender to palpation in the RUQ    VASCULAR  Exam: Extremities warm, well-perfused    MUSCULOSKELETAL  Exam: All extremities moving spontaneously without limitations        Tubes/Lines/Drains   [x] Peripheral IV  [] Central Venous Line     	[] R	[] L	[] IJ	[] Fem	[] SC	Date Placed:   [] Arterial Line		[] R	[] L	[] Fem	[] Rad	[] Ax	Date Placed:   [] PICC:         	[] Midline		[] Mediport  [] Urinary Catheter		Date Placed:     LABS  --------------------------------------------------------------------------------------    --------------------------------------------------------------------------------------    IMAGING  --------------------------------------------------------------------------------------    --------------------------------------------------------------------------------------   SICU Consultation Note  =====================================================  HPI:   75F HTN, COPD, DM, and recent BL LE stents (on ASA/PVX, started 4/24, last dose 5/9) who was transferred from [facility] for acute cholecystitis. She presented initially on 5/9 with 1 day of abdominal pain and nausea, found to have leukocytosis (WBC 20.3) and CT findings of a distended, inflamed GB c/f acute cholecystitis. During her hospital admission, her WBC became elevated from 13.6 > 20.3 > 38.6. She was transferred to [facility] for surgical intervention. On presentation to [facility], she was afebrile, hemodynamically stable.       Allergies: No Known Allergies    PAST MEDICAL & SURGICAL HISTORY:  HTN (hypertension)      Diabetes mellitus  Type 2 (on Metformin and Glimepiride)      Depression      Spinal stenosis  s/p "back surgery"      Herniated lumbar intervertebral disc      Hyperlipidemia      Paralytic ileus  3-4 years ago, spontaneous - hospitalized with NGT for decompression; resolved on it owns own      Renal calculi      H/O arthroscopy of left knee  ~15 years ago; Repair of the left meniscus      H/O bilateral cataract extraction  ~5 years ago      Previous back surgery  as per patient, "they shaved a bone in my back." ~4-5 years ago with Dr. Gracia      H/O cystoscopy  Pt reports hx of cystoscopy with stent placement "years ago"        FAMILY HISTORY:      SOCIAL HISTORY:      ADVANCE DIRECTIVES: Full Code      REVIEW OF SYSTEMS:  Negative, except as noted in the HPI    HOME MEDICATIONS:   --------------------------------------------------------------------------------------    aspirin 81 mg oral capsule: 1 cap(s) orally once a day (12 May 2025 05:18)  atorvastatin 40 mg oral tablet: 1 tab(s) orally once a day (12 May 2025 05:22)  D3 25 mcg (1000 intl units) oral tablet: 1 tab(s) orally once a day (12 May 2025 05:22)  glimepiride 4 mg oral tablet: 1 tab(s) orally once a day (12 May 2025 05:30)  hydroCHLOROthiazide 25 mg oral tablet: 1 tab(s) orally once a day (12 May 2025 05:22)  losartan 100 mg oral tablet: 1 tab(s) orally once a day (12 May 2025 05:22)  metFORMIN 1000 mg oral tablet, extended release: 1 tab(s) orally once a day (12 May 2025 05:22)  metoprolol succinate 50 mg oral capsule, extended release: 1 cap(s) orally once a day (12 May 2025 05:22)  Plavix 75 mg oral tablet: 1 tab(s) orally once a day (12 May 2025 05:18)  PreserVision AREDS 2 oral capsule: orally once a day (12 May 2025 05:23)  sertraline 100 mg oral tablet: 1 tab(s) orally once a day (12 May 2025 05:22)    --------------------------------------------------------------------------------------      VITAL SIGNS, INS/OUTS (last 24 hours):  --------------------------------------------------------------------------------------    T(C): 37.3 (05-12-25 @ 04:45), Max: 37.3 (05-12-25 @ 04:45)  T(F): 99.1 (05-12-25 @ 04:45), Max: 99.1 (05-12-25 @ 04:45)  HR: 70 (05-12-25 @ 05:00) (70 - 73)  BP: 131/50 (05-12-25 @ 05:00) (129/49 - 131/50)  BP(mean): 74 (05-12-25 @ 05:00) (70 - 74)  RR: 18 (05-12-25 @ 05:00) (18 - 18)  SpO2: 100% (05-12-25 @ 05:00) (100% - 100%)    --------------------------------------------------------------------------------------    EXAM  NEUROLOGY  Exam: Normal, NAD, alert, oriented x 3, no focal deficits    HEENT  Exam: Normocephalic, atraumatic, EOMI, anicteric    RESPIRATORY  Exam: Lungs clear to auscultation, Normal expansion/effort    CARDIOVASCULAR  Exam: Regular rate, normotensive, not on pressors    GI/NUTRITION  Exam: Abdomen soft, non-distended, severely tender to palpation in the RUQ    VASCULAR  Exam: Extremities warm, well-perfused    MUSCULOSKELETAL  Exam: All extremities moving spontaneously without limitations        Tubes/Lines/Drains   [x] Peripheral IV  [] Central Venous Line     	[] R	[] L	[] IJ	[] Fem	[] SC	Date Placed:   [] Arterial Line		[] R	[] L	[] Fem	[] Rad	[] Ax	Date Placed:   [] PICC:         	[] Midline		[] Mediport  [] Urinary Catheter		Date Placed:     LABS  --------------------------------------------------------------------------------------    --------------------------------------------------------------------------------------    IMAGING  --------------------------------------------------------------------------------------  TTE (8/4/2023):  The left ventricular systolic function is normal with an ejection fraction of 55 % by Andrews's method of disks.2.There is mild (grade 1) left ventricular diastolic dysfunction.3.There is moderate calcification of the mitral valve annulus.4.Normal atria.5.Normal right ventricular cavity size, normal right ventricular wall thickness and normal right ventricular systolic function. The tricuspid annular plane systolic excursion (TAPSE) is 2.0 cm (normal >=1.7 cm).6.No pericardial effusion seen  --------------------------------------------------------------------------------------

## 2025-05-12 NOTE — CONSULT NOTE ADULT - ASSESSMENT
75F HTN, COPD, DM, and recent BL LE stents (on ASA/PVX, started 4/24) who was transferred from [facility] for acute cholecystitis with concern for sepsis. Patient currently hemodynamically stable and afebrile.     PLAN:  NEURO:  - Pain control: Tylenol, dilaudid    RESPIRATORY:  - Maintain SpO2 >92%  - On RA    CARDIOVASCULAR:  - Maintain MAP >65  - TTE ordered, pending  - No pressor requirements    GI/NUTRITION:  - Diet: NPO  - RUQ US ordered, pending    /RENAL:  - LR @ 75  - Voiding  - Strict I/Os    VASCULAR/HEME:  - DVT ppx: SCDs   - Last ASA/plavix 5/11 AM    INFECTIOUS DISEASE:  - Zosyn  - Trend WBC    ENDOCRINE:  - POC glucose q6h  - TAQUERIA  - Holding home metformin, glimepiride     LINES/TUBES/DRAINS:  - PIV    DISPO: SICU

## 2025-05-12 NOTE — CONSULT NOTE ADULT - SUBJECTIVE AND OBJECTIVE BOX
Interventional Radiology    Evaluate for Procedure: Percutaneous cholecystostomy    HPI: 75F HTN, COPD, DM, and recent BL LE stents (on ASA/PVX, started 4/24) who was transferred from [facility] for acute cholecystitis. She presented initially on 5/9 with 1 day of abdominal pain and nausea, found to have leukocytosis (WBC 20.3) and CT findings of a distended, inflamed GB c/f acute cholecystitis. During her hospital admission, her WBC became elevated from 13.6 > 20.3 > 38.6. She was transferred to [facility] for surgical intervention. IR consulted for Percutaneous cholecystostomy.    Allergies: No Known Allergies    Medications (Abx/Cardiac/Anticoagulation/Blood Products)    piperacillin/tazobactam IVPB.: 200 mL/Hr IV Intermittent (05-12 @ 05:50)  piperacillin/tazobactam IVPB.-: 25 mL/Hr IV Intermittent (05-12 @ 09:26)    Data:  157.5  63  T(C): 37.3  HR: 68  BP: 129/50  RR: 12  SpO2: 97%    -WBC 31.30 / HgB 8.8 / Hct 26.6 / Plt 398  -Na 139 / Cl 100 / BUN 26 / Glucose 128  -K 3.5 / CO2 23 / Cr 1.43  - / Alk Phos 732 / T.Bili 1.6  -INR 1.52 / PTT 32.4      Radiology:   RUSERGIO POCUS 5/12/2025      Assessment/Plan:     75F HTN, COPD, DM, and recent BL LE stents (on ASA/PVX, started 4/24) who was transferred from [facility] for acute cholecystitis. During her hospital admission, her WBC became elevated from 13.6 > 20.3 > 38.6. She was transferred to [facility] for surgical intervention.      - Case discussed with Critical Care Team  - Given patients clinical status, emergent procedural intervention is indicated despite them requiring continued AC therapy   - Will plan on performing Percutaneous cholecystostomy later today pending CT results  - Please keep patient NPO   - Please complete IR pre-procedure note  - Please place IR procedure request order under Dr. Schmitz    --  Constantine Henderson MD, PGY-2   Vascular and Interventional Radiology   Available on Microsoft Teams    - Non-emergent consults: Place IR consult order in [place]  - Emergent issues (pager): [facility] [phone]; [facility] [phone]; [phone]  - Scheduling questions: [facility] [phone]; [facility] [phone]  - Clinic/outpatient booking: [facility] [phone]; [facility] [phone]

## 2025-05-12 NOTE — H&P ADULT - NSHPPHYSICALEXAM_GEN_ALL_CORE
GEN: resting in bed comfortably in NAD  NEURO: awake, alert  HEENT: normocephalic, atraumatic, (-) scleral icterus  CV: warm, well-perfused  CHEST: bilateral equal chest wall rise  RESP: no increased WOB  ABD: soft, non-distended, tender to palpation in RUQ without rebound tenderness or guarding; (+) Bourne's sign  EXTR: no gross deformities; spontaneous movement in b/l U/L extrem   SKIN: non-jaundiced

## 2025-05-12 NOTE — H&P ADULT - NSHPLABSRESULTS_GEN_ALL_CORE
LABS:     IMAGING:     CT AP (5/10 - [place]'s):   1. Distended and inflamed gallbladder suspicious for acute cholecystitis. No biliary ductal dilation.   2. Nonobstructing bilateral intrarenal stones  3. Diverticulosis coli  4. Calcified uterine fibroids

## 2025-05-13 LAB
ALBUMIN SERPL ELPH-MCNC: 2.5 G/DL — LOW (ref 3.3–5)
ALP SERPL-CCNC: 826 U/L — HIGH (ref 40–120)
ALT FLD-CCNC: 293 U/L — HIGH (ref 4–33)
ANION GAP SERPL CALC-SCNC: 13 MMOL/L — SIGNIFICANT CHANGE UP (ref 7–14)
AST SERPL-CCNC: 515 U/L — HIGH (ref 4–32)
BILIRUB DIRECT SERPL-MCNC: 2 MG/DL — HIGH (ref 0–0.3)
BILIRUB INDIRECT FLD-MCNC: 0.1 MG/DL — SIGNIFICANT CHANGE UP (ref 0–1)
BILIRUB SERPL-MCNC: 2.1 MG/DL — HIGH (ref 0.2–1.2)
BLD GP AB SCN SERPL QL: NEGATIVE — SIGNIFICANT CHANGE UP
BUN SERPL-MCNC: 21 MG/DL — SIGNIFICANT CHANGE UP (ref 7–23)
CALCIUM SERPL-MCNC: 7.8 MG/DL — LOW (ref 8.4–10.5)
CHLORIDE SERPL-SCNC: 98 MMOL/L — SIGNIFICANT CHANGE UP (ref 98–107)
CO2 SERPL-SCNC: 24 MMOL/L — SIGNIFICANT CHANGE UP (ref 22–31)
CREAT SERPL-MCNC: 1.32 MG/DL — HIGH (ref 0.5–1.3)
EGFR: 42 ML/MIN/1.73M2 — LOW
EGFR: 42 ML/MIN/1.73M2 — LOW
GLUCOSE BLDC GLUCOMTR-MCNC: 150 MG/DL — HIGH (ref 70–99)
GLUCOSE BLDC GLUCOMTR-MCNC: 158 MG/DL — HIGH (ref 70–99)
GLUCOSE BLDC GLUCOMTR-MCNC: 159 MG/DL — HIGH (ref 70–99)
GLUCOSE BLDC GLUCOMTR-MCNC: 169 MG/DL — HIGH (ref 70–99)
GLUCOSE SERPL-MCNC: 128 MG/DL — HIGH (ref 70–99)
GRAM STN FLD: SIGNIFICANT CHANGE UP
HCT VFR BLD CALC: 24.6 % — LOW (ref 34.5–45)
HGB BLD-MCNC: 8.1 G/DL — LOW (ref 11.5–15.5)
MAGNESIUM SERPL-MCNC: 2 MG/DL — SIGNIFICANT CHANGE UP (ref 1.6–2.6)
MCHC RBC-ENTMCNC: 26.5 PG — LOW (ref 27–34)
MCHC RBC-ENTMCNC: 32.9 G/DL — SIGNIFICANT CHANGE UP (ref 32–36)
MCV RBC AUTO: 80.4 FL — SIGNIFICANT CHANGE UP (ref 80–100)
NRBC # BLD AUTO: 0.04 K/UL — HIGH (ref 0–0)
NRBC # FLD: 0.04 K/UL — HIGH (ref 0–0)
NRBC BLD AUTO-RTO: 0 /100 WBCS — SIGNIFICANT CHANGE UP (ref 0–0)
PHOSPHATE SERPL-MCNC: 2.9 MG/DL — SIGNIFICANT CHANGE UP (ref 2.5–4.5)
PLATELET # BLD AUTO: 381 K/UL — SIGNIFICANT CHANGE UP (ref 150–400)
POTASSIUM SERPL-MCNC: 3.3 MMOL/L — LOW (ref 3.5–5.3)
POTASSIUM SERPL-SCNC: 3.3 MMOL/L — LOW (ref 3.5–5.3)
PROT SERPL-MCNC: 5.4 G/DL — LOW (ref 6–8.3)
RBC # BLD: 3.06 M/UL — LOW (ref 3.8–5.2)
RBC # FLD: 17.2 % — HIGH (ref 10.3–14.5)
RH IG SCN BLD-IMP: POSITIVE — SIGNIFICANT CHANGE UP
SODIUM SERPL-SCNC: 135 MMOL/L — SIGNIFICANT CHANGE UP (ref 135–145)
SPECIMEN SOURCE: SIGNIFICANT CHANGE UP
WBC # BLD: 21.39 K/UL — HIGH (ref 3.8–10.5)
WBC # FLD AUTO: 21.39 K/UL — HIGH (ref 3.8–10.5)

## 2025-05-13 PROCEDURE — 99233 SBSQ HOSP IP/OBS HIGH 50: CPT

## 2025-05-13 PROCEDURE — 99233 SBSQ HOSP IP/OBS HIGH 50: CPT | Mod: FS,GC

## 2025-05-13 RX ORDER — OXYCODONE HYDROCHLORIDE 30 MG/1
5 TABLET ORAL EVERY 4 HOURS
Refills: 0 | Status: DISCONTINUED | OUTPATIENT
Start: 2025-05-13 | End: 2025-05-16

## 2025-05-13 RX ORDER — HEPARIN SODIUM 1000 [USP'U]/ML
5000 INJECTION INTRAVENOUS; SUBCUTANEOUS EVERY 8 HOURS
Refills: 0 | Status: DISCONTINUED | OUTPATIENT
Start: 2025-05-13 | End: 2025-05-16

## 2025-05-13 RX ORDER — ACETAMINOPHEN 500 MG/5ML
975 LIQUID (ML) ORAL EVERY 6 HOURS
Refills: 0 | Status: DISCONTINUED | OUTPATIENT
Start: 2025-05-13 | End: 2025-05-16

## 2025-05-13 RX ORDER — OXYCODONE HYDROCHLORIDE 30 MG/1
2.5 TABLET ORAL EVERY 4 HOURS
Refills: 0 | Status: DISCONTINUED | OUTPATIENT
Start: 2025-05-13 | End: 2025-05-16

## 2025-05-13 RX ORDER — INSULIN LISPRO 100 U/ML
INJECTION, SOLUTION INTRAVENOUS; SUBCUTANEOUS
Refills: 0 | Status: DISCONTINUED | OUTPATIENT
Start: 2025-05-13 | End: 2025-05-16

## 2025-05-13 RX ADMIN — Medication 25 GRAM(S): at 05:24

## 2025-05-13 RX ADMIN — Medication 1000 MILLIGRAM(S): at 05:54

## 2025-05-13 RX ADMIN — INSULIN LISPRO 1: 100 INJECTION, SOLUTION INTRAVENOUS; SUBCUTANEOUS at 22:21

## 2025-05-13 RX ADMIN — SERTRALINE 100 MILLIGRAM(S): 100 TABLET, FILM COATED ORAL at 11:03

## 2025-05-13 RX ADMIN — HEPARIN SODIUM 5000 UNIT(S): 1000 INJECTION INTRAVENOUS; SUBCUTANEOUS at 05:24

## 2025-05-13 RX ADMIN — Medication 400 MILLIGRAM(S): at 05:24

## 2025-05-13 RX ADMIN — Medication 40 MILLIEQUIVALENT(S): at 04:25

## 2025-05-13 RX ADMIN — Medication 81 MILLIGRAM(S): at 11:03

## 2025-05-13 RX ADMIN — Medication 975 MILLIGRAM(S): at 17:06

## 2025-05-13 RX ADMIN — Medication 1000 MILLIGRAM(S): at 00:30

## 2025-05-13 RX ADMIN — Medication 1000 UNIT(S): at 11:03

## 2025-05-13 RX ADMIN — HEPARIN SODIUM 5000 UNIT(S): 1000 INJECTION INTRAVENOUS; SUBCUTANEOUS at 22:20

## 2025-05-13 RX ADMIN — Medication 25 GRAM(S): at 14:12

## 2025-05-13 RX ADMIN — Medication 400 MILLIGRAM(S): at 00:00

## 2025-05-13 RX ADMIN — Medication 1 APPLICATION(S): at 11:02

## 2025-05-13 RX ADMIN — HEPARIN SODIUM 5000 UNIT(S): 1000 INJECTION INTRAVENOUS; SUBCUTANEOUS at 14:12

## 2025-05-13 RX ADMIN — INSULIN LISPRO 1: 100 INJECTION, SOLUTION INTRAVENOUS; SUBCUTANEOUS at 11:10

## 2025-05-13 RX ADMIN — Medication 25 GRAM(S): at 22:20

## 2025-05-13 RX ADMIN — INSULIN LISPRO 1: 100 INJECTION, SOLUTION INTRAVENOUS; SUBCUTANEOUS at 17:22

## 2025-05-13 RX ADMIN — Medication 975 MILLIGRAM(S): at 11:03

## 2025-05-13 RX ADMIN — ATORVASTATIN CALCIUM 40 MILLIGRAM(S): 80 TABLET, FILM COATED ORAL at 22:20

## 2025-05-13 NOTE — DIETITIAN INITIAL EVALUATION ADULT - PERTINENT LABORATORY DATA
05-13    135  |  98  |  21  ----------------------------<  128[H]  3.3[L]   |  24  |  1.32[H]    Ca    7.8[L]      13 May 2025 00:30  Phos  2.9     05-13  Mg     2.00     05-13    TPro  5.4[L]  /  Alb  2.5[L]  /  TBili  2.1[H]  /  DBili  2.0[H]  /  AST  515[H]  /  ALT  293[H]  /  AlkPhos  826[H]  05-13  POCT Blood Glucose.: 169 mg/dL (05-13-25 @ 11:02)  A1C with Estimated Average Glucose Result: 6.4 % (05-12-25 @ 05:30)

## 2025-05-13 NOTE — CHART NOTE - NSCHARTNOTEFT_GEN_A_CORE
ATP INCIDENTAL FINDING:     On CT AP on admission on 5/12/25, patient was found to have incidental findings of:   1. There is a 17 mm hypoenhancing right adrenal nodule, a 1.8 cm hypoenhancing left adrenal nodule and a 2.0 cm homogeneously enhancing left adrenal nodule, favored to represent adenomas.    These findings were discussed with the patient. A copy of the report was provided to the patient. The patient verbalized understanding of these findings and all questions were answered.     These findings have been placed in the patient's discharge paperwork with instructions to follow-up with his/her PCP to reinforce the importance of OP workup.

## 2025-05-13 NOTE — CHART NOTE - NSCHARTNOTEFT_GEN_A_CORE
SICU Transfer Note    Transfer from: SICU  Transfer to: B Team Surgery, Bed 849B  Accepting physician: Dr. Alfonso Whitley    Patient medically optimized and hemodynamically stable for transfer to the floor. Patient signed out to Dr. Johnson, all questions answered.    Mary Cao PA-C  SICU #[id number]

## 2025-05-13 NOTE — CHART NOTE - NSCHARTNOTEFT_GEN_A_CORE
Post Operative Note  Patient: POLLO WATKINS 75y (1950) Female   MRN: [id number]  Location: [place] [address]  Visit: 05-12-25 Inpatient  Date: 05-13-25 @ 00:06    Procedure: S/P Percutaneous cholecystostomy    Subjective: Patient seen and examined post operatively. Reports pain as controlled. Denies nausea, vomiting, fever, chills, chest pain, SOB, cough.      Objective:  Vitals: T(F): 97 (05-12-25 @ 20:00), Max: 99.1 (05-12-25 @ 04:45)  HR: 54 (05-12-25 @ 23:00)  BP: 146/57 (05-12-25 @ 23:00) (116/46 - 146/57)  RR: 13 (05-12-25 @ 23:00)  SpO2: 98% (05-12-25 @ 23:00)    Is & Os:  05-11-25 @ 07:01  -  05-12-25 @ 07:00  --------------------------------------------------------  IN: 350 mL / OUT: 0 mL / NET: 350 mL    05-12-25 @ 07:01  -  05-13-25 @ 00:06  --------------------------------------------------------  IN: 1350 mL / OUT: 1220 mL / NET: 130 mL        Physical Examination:  General: NAD, resting comfortably in bed  Abdomen: softly distended, appropriately tender    Imaging:  No post-op imaging studies    Assessment:  75yFemale patient S/P percutaneous cholecytostomy    Plan:  - IV Abx: piperacillin/tazobactam IVPB..  - Pain: acetaminophen   IVPB ..; acetaminophen   IVPB ..; HYDROmorphone  Injectable PRN; HYDROmorphone  Injectable PRN; sertraline  - Activity: OOBAT  - DVT ppx: SCD's & aspirin  chewable 81 daily    Date/Time: 05-13-25 @ 00:06

## 2025-05-13 NOTE — DIETITIAN INITIAL EVALUATION ADULT - OTHER INFO
74 y/o female with hx HTN, DM, spinal stenosis, HLD and renal calculi admitted with acute cholecystitis c/f sepsis. Visited with pt and family at bedside to obtain nutrition hx. Patient now advanced to Consistent Carbohydrate diet from [employer]. Pt said that she is "taking it slow" and choosing foods from her meal that she feels comfortable to try eating. She denied any wt changes related to inadequate nutrition intake PTA. Pt confirms NKFA, denies difficulties chewing/swallowing. Pt lives at home, independent with ADLs PTA. Notable medications PTA per chart inclusive of metformin, losartan, glimepiride and metroprolol.     Pt requires assistance with tray set up but is able to feed self independently. No BMs per flowsheets. Consider bowel regimen as appropriate. Labs notable for FS 89 - 195 mg/dl over the past 24 hrs with Admelog ISS coverage. Hb A1C 6.4% indicating good glycemic control PTA. Pt verified following diabetic diet guidelines PTA. Reinforced dietary restriction and discussed food choices for optimal healing and recovery with emphasis on high protein foods such as lean meat, poultry, fish and low fat dairy. Pt verbalized understanding. Offered oral supplementation which was declined as pt did not want to eat too much at this time. Continue to monitor and encourage oral intake as tolerated. RDN services to remain available as needed.

## 2025-05-13 NOTE — DIETITIAN INITIAL EVALUATION ADULT - PERTINENT MEDS FT
MEDICATIONS  (STANDING):  acetaminophen     Tablet .. 975 milliGRAM(s) Oral every 6 hours  aspirin  chewable 81 milliGRAM(s) Oral daily  atorvastatin 40 milliGRAM(s) Oral at bedtime  chlorhexidine 2% Cloths 1 Application(s) Topical daily  cholecalciferol 1000 Unit(s) Oral daily  heparin   Injectable 5000 Unit(s) SubCutaneous every 8 hours  insulin lispro (ADMELOG) corrective regimen sliding scale   SubCutaneous Before meals and at bedtime  piperacillin/tazobactam IVPB.. 3.375 Gram(s) IV Intermittent every 8 hours  sertraline 100 milliGRAM(s) Oral daily    MEDICATIONS  (PRN):  oxyCODONE    IR 2.5 milliGRAM(s) Oral every 4 hours PRN Moderate Pain (4 - 6)  oxyCODONE    IR 5 milliGRAM(s) Oral every 4 hours PRN Severe Pain (7 - 10)

## 2025-05-14 LAB
ALBUMIN SERPL ELPH-MCNC: 2.5 G/DL — LOW (ref 3.3–5)
ALP SERPL-CCNC: 679 U/L — HIGH (ref 40–120)
ALT FLD-CCNC: 225 U/L — HIGH (ref 4–33)
ANION GAP SERPL CALC-SCNC: 13 MMOL/L — SIGNIFICANT CHANGE UP (ref 7–14)
AST SERPL-CCNC: 228 U/L — HIGH (ref 4–32)
BILIRUB SERPL-MCNC: 0.8 MG/DL — SIGNIFICANT CHANGE UP (ref 0.2–1.2)
BUN SERPL-MCNC: 16 MG/DL — SIGNIFICANT CHANGE UP (ref 7–23)
CALCIUM SERPL-MCNC: 7.6 MG/DL — LOW (ref 8.4–10.5)
CHLORIDE SERPL-SCNC: 102 MMOL/L — SIGNIFICANT CHANGE UP (ref 98–107)
CO2 SERPL-SCNC: 24 MMOL/L — SIGNIFICANT CHANGE UP (ref 22–31)
CREAT SERPL-MCNC: 1.03 MG/DL — SIGNIFICANT CHANGE UP (ref 0.5–1.3)
EGFR: 56 ML/MIN/1.73M2 — LOW
EGFR: 56 ML/MIN/1.73M2 — LOW
GLUCOSE BLDC GLUCOMTR-MCNC: 102 MG/DL — HIGH (ref 70–99)
GLUCOSE BLDC GLUCOMTR-MCNC: 109 MG/DL — HIGH (ref 70–99)
GLUCOSE BLDC GLUCOMTR-MCNC: 131 MG/DL — HIGH (ref 70–99)
GLUCOSE BLDC GLUCOMTR-MCNC: 91 MG/DL — SIGNIFICANT CHANGE UP (ref 70–99)
GLUCOSE SERPL-MCNC: 91 MG/DL — SIGNIFICANT CHANGE UP (ref 70–99)
HCT VFR BLD CALC: 23.5 % — LOW (ref 34.5–45)
HGB BLD-MCNC: 7.7 G/DL — LOW (ref 11.5–15.5)
MAGNESIUM SERPL-MCNC: 1.5 MG/DL — LOW (ref 1.6–2.6)
MCHC RBC-ENTMCNC: 26.3 PG — LOW (ref 27–34)
MCHC RBC-ENTMCNC: 32.8 G/DL — SIGNIFICANT CHANGE UP (ref 32–36)
MCV RBC AUTO: 80.2 FL — SIGNIFICANT CHANGE UP (ref 80–100)
NRBC # BLD AUTO: 0 K/UL — SIGNIFICANT CHANGE UP (ref 0–0)
NRBC # FLD: 0 K/UL — SIGNIFICANT CHANGE UP (ref 0–0)
NRBC BLD AUTO-RTO: 0 /100 WBCS — SIGNIFICANT CHANGE UP (ref 0–0)
PHOSPHATE SERPL-MCNC: 2.1 MG/DL — LOW (ref 2.5–4.5)
PLATELET # BLD AUTO: 462 K/UL — HIGH (ref 150–400)
POTASSIUM SERPL-MCNC: 3.2 MMOL/L — LOW (ref 3.5–5.3)
POTASSIUM SERPL-SCNC: 3.2 MMOL/L — LOW (ref 3.5–5.3)
PROT SERPL-MCNC: 5.3 G/DL — LOW (ref 6–8.3)
RBC # BLD: 2.93 M/UL — LOW (ref 3.8–5.2)
RBC # FLD: 17.5 % — HIGH (ref 10.3–14.5)
SODIUM SERPL-SCNC: 139 MMOL/L — SIGNIFICANT CHANGE UP (ref 135–145)
WBC # BLD: 12.71 K/UL — HIGH (ref 3.8–10.5)
WBC # FLD AUTO: 12.71 K/UL — HIGH (ref 3.8–10.5)

## 2025-05-14 PROCEDURE — 99232 SBSQ HOSP IP/OBS MODERATE 35: CPT | Mod: FS,GC

## 2025-05-14 RX ORDER — AMOXICILLIN AND CLAVULANATE POTASSIUM 500; 125 MG/1; MG/1
1 TABLET, FILM COATED ORAL
Qty: 4 | Refills: 0
Start: 2025-05-14 | End: 2025-05-15

## 2025-05-14 RX ORDER — MAGNESIUM SULFATE 500 MG/ML
2 SYRINGE (ML) INJECTION ONCE
Refills: 0 | Status: COMPLETED | OUTPATIENT
Start: 2025-05-14 | End: 2025-05-14

## 2025-05-14 RX ORDER — SOD PHOS DI, MONO/K PHOS MONO 250 MG
1 TABLET ORAL
Refills: 0 | Status: COMPLETED | OUTPATIENT
Start: 2025-05-14 | End: 2025-05-14

## 2025-05-14 RX ORDER — ACETAMINOPHEN 500 MG/5ML
3 LIQUID (ML) ORAL
Qty: 0 | Refills: 0 | DISCHARGE
Start: 2025-05-14

## 2025-05-14 RX ORDER — ONDANSETRON HCL/PF 4 MG/2 ML
4 VIAL (ML) INJECTION ONCE
Refills: 0 | Status: COMPLETED | OUTPATIENT
Start: 2025-05-14 | End: 2025-05-14

## 2025-05-14 RX ADMIN — Medication 81 MILLIGRAM(S): at 11:47

## 2025-05-14 RX ADMIN — SERTRALINE 100 MILLIGRAM(S): 100 TABLET, FILM COATED ORAL at 11:35

## 2025-05-14 RX ADMIN — Medication 25 GRAM(S): at 05:18

## 2025-05-14 RX ADMIN — Medication 4 MILLIGRAM(S): at 17:49

## 2025-05-14 RX ADMIN — HEPARIN SODIUM 5000 UNIT(S): 1000 INJECTION INTRAVENOUS; SUBCUTANEOUS at 22:46

## 2025-05-14 RX ADMIN — Medication 40 MILLIEQUIVALENT(S): at 11:34

## 2025-05-14 RX ADMIN — Medication 25 GRAM(S): at 13:49

## 2025-05-14 RX ADMIN — HEPARIN SODIUM 5000 UNIT(S): 1000 INJECTION INTRAVENOUS; SUBCUTANEOUS at 13:49

## 2025-05-14 RX ADMIN — Medication 1000 UNIT(S): at 11:35

## 2025-05-14 RX ADMIN — ATORVASTATIN CALCIUM 40 MILLIGRAM(S): 80 TABLET, FILM COATED ORAL at 22:46

## 2025-05-14 RX ADMIN — Medication 1 TABLET(S): at 17:49

## 2025-05-14 RX ADMIN — HEPARIN SODIUM 5000 UNIT(S): 1000 INJECTION INTRAVENOUS; SUBCUTANEOUS at 05:18

## 2025-05-14 RX ADMIN — Medication 975 MILLIGRAM(S): at 11:34

## 2025-05-14 RX ADMIN — Medication 25 GRAM(S): at 22:46

## 2025-05-14 RX ADMIN — Medication 1 TABLET(S): at 11:34

## 2025-05-14 RX ADMIN — Medication 1 APPLICATION(S): at 11:47

## 2025-05-14 RX ADMIN — Medication 975 MILLIGRAM(S): at 05:18

## 2025-05-14 RX ADMIN — Medication 975 MILLIGRAM(S): at 17:49

## 2025-05-14 RX ADMIN — Medication 25 GRAM(S): at 11:33

## 2025-05-14 NOTE — DISCHARGE NOTE PROVIDER - NSDCCPTREATMENT_GEN_ALL_CORE_FT
PRINCIPAL PROCEDURE  Procedure: Percutaneous cholecystostomy  Findings and Treatment: Please flush with 5ml of normal saline daily. Please keep dressing site clean, dry, and intact. Do not scrub the area.  Patient will need routine 3 months exchanges. Please call IR office [phone]     PRINCIPAL PROCEDURE  Procedure: Percutaneous cholecystostomy  Findings and Treatment: Please flush with 5ml of normal saline daily. Please keep dressing site clean, dry, and intact. Do not scrub the area.  Patient will need routine 3 months exchanges. Please call  office [phone]  WOUND CARE:  Please keep incisions clean and dry. Please do not Scrub or rub incisions. Do not use lotion or powder on incisions.   BATHING: You may shower. You may use warm soapy water in the shower and rinse, pat dry.  ACTIVITY: No heavy lifting or straining. Otherwise, you may return to your usual level of physical activity. If you are taking narcotic pain medication DO NOT drive a car, operate machinery or make important decisions.  PAIN: You may take over-the-counter medications for pain. You make take Tylenol orally every 6 hours as needed. Do not exceed 4,000mg a day. You may take ibuprofen every 6 hours. You may alternate between the two for better pain control (every 3 hours). You have been prescribed narcotics for pain management. Please take as prescribed on pill bottle, AS NEEDED, for BREAKTHROUGH pain ONLY. If you are taking narcotic pain medication DO NOT drive a car, operate machinery or make important decisions.  DIET: Return to your usual diet.  NOTIFY YOUR SURGEON IF YOU HAVE: any bleeding that does not stop, any pus draining from your wound(s), any fever (over 100.4 F) persistent nausea/vomiting, or if your pain is not controlled on your discharge pain medications, unable to urinate.  FOLLOW UP:  1. Please follow up with your primary care physician in one week regarding your hospitalization, bring copies of your discharge paperwork.  2. Please follow up with your surgeon, Dr. Little

## 2025-05-14 NOTE — DISCHARGE NOTE PROVIDER - HOSPITAL COURSE
74 y/o F with Hx HTN, COPD, DM, and recent BLE stents (on ASA/PVX, started 4/24) who was transferred from [facility] for acute cholecystitis. She presented initially on 5/9 with 1 day of abdominal pain and nausea, found to have leukocytosis (WBC 20.3) and CT findings of a distended, inflamed GB c/f acute cholecystitis. During her hospital admission, her WBC became elevated from 13.6 > 20.3 > 38.6. She was transferred to [facility] for surgical intervention and was admitted to SICU. On presentation to [facility], she was afebrile, hemodynamically stable. She underwent percutaneous cholecystectomy tube placement on 05/12/25 with interventional radiology. Patient tolerated procedure well and there were no complications identified. Patient was downgraded from SICU to the floor. Diet was restarted and advanced as tolerated. Her bilirubin normalized and LFT's continued to downtrend. Pain control was transitioned from IV to PO pain meds. At this time, patient is currently ambulating, voiding, tolerating a regular diet. Pain well controlled on PO pain meds. Patient has been deemed stable for discharge home with follow up as an outpatient.

## 2025-05-14 NOTE — DISCHARGE NOTE PROVIDER - NSDCFUADDAPPT_GEN_ALL_CORE_FT
Please restart Plavix on Monday 5/19 Please restart Plavix on Monday 5/19. Please follow up with your vascular surgeon.     If no surgical Intervention, will need routine 3 months exchanges. Please call IR office [phone] Discontinue Plavix, continue to take Aspirin. Please follow up with your vascular surgeon.     If no surgical Intervention, will need routine 3 months exchanges. Please call IR office [phone]

## 2025-05-14 NOTE — DISCHARGE NOTE PROVIDER - CARE PROVIDER_API CALL
Drake Little  Surgery  [id number] [place] [id number]  Phone: [phone]  Fax: [phone]  Follow Up Time: 2 weeks

## 2025-05-14 NOTE — DISCHARGE NOTE PROVIDER - NSDCMRMEDTOKEN_GEN_ALL_CORE_FT
aspirin 81 mg oral capsule: 1 cap(s) orally once a day  atorvastatin 40 mg oral tablet: 1 tab(s) orally once a day  D3 25 mcg (1000 intl units) oral tablet: 1 tab(s) orally once a day  glimepiride 4 mg oral tablet: 1 tab(s) orally once a day  hydroCHLOROthiazide 25 mg oral tablet: 1 tab(s) orally once a day  losartan 100 mg oral tablet: 1 tab(s) orally once a day  metFORMIN 1000 mg oral tablet, extended release: 1 tab(s) orally once a day  metoprolol succinate 50 mg oral capsule, extended release: 1 cap(s) orally once a day  Plavix 75 mg oral tablet: 1 tab(s) orally once a day  PreserVision AREDS 2 oral capsule: orally once a day  sertraline 100 mg oral tablet: 1 tab(s) orally once a day   acetaminophen 325 mg oral tablet: 3 tab(s) orally every 6 hours  amoxicillin-clavulanate 875 mg-125 mg oral tablet: 1 tab(s) orally every 12 hours  aspirin 81 mg oral capsule: 1 cap(s) orally once a day  atorvastatin 40 mg oral tablet: 1 tab(s) orally once a day  D3 25 mcg (1000 intl units) oral tablet: 1 tab(s) orally once a day  glimepiride 4 mg oral tablet: 1 tab(s) orally once a day  hydroCHLOROthiazide 25 mg oral tablet: 1 tab(s) orally once a day  losartan 100 mg oral tablet: 1 tab(s) orally once a day  metFORMIN 1000 mg oral tablet, extended release: 1 tab(s) orally once a day  metoprolol succinate 50 mg oral capsule, extended release: 1 cap(s) orally once a day  Plavix 75 mg oral tablet: 1 tab(s) orally once a day  PreserVision AREDS 2 oral capsule: orally once a day  sertraline 100 mg oral tablet: 1 tab(s) orally once a day   acetaminophen 325 mg oral tablet: 3 tab(s) orally every 6 hours  amoxicillin-clavulanate 875 mg-125 mg oral tablet: 1 tab(s) orally every 12 hours  aspirin 81 mg oral capsule: 1 cap(s) orally once a day  atorvastatin 40 mg oral tablet: 1 tab(s) orally once a day  D3 25 mcg (1000 intl units) oral tablet: 1 tab(s) orally once a day  glimepiride 4 mg oral tablet: 1 tab(s) orally once a day  hydroCHLOROthiazide 25 mg oral tablet: 1 tab(s) orally once a day  losartan 100 mg oral tablet: 1 tab(s) orally once a day  metFORMIN 1000 mg oral tablet, extended release: 1 tab(s) orally once a day  metoprolol succinate 50 mg oral capsule, extended release: 1 cap(s) orally once a day  PreserVision AREDS 2 oral capsule: orally once a day  sertraline 100 mg oral tablet: 1 tab(s) orally once a day

## 2025-05-15 LAB
ALBUMIN SERPL ELPH-MCNC: 2.7 G/DL — LOW (ref 3.3–5)
ALP SERPL-CCNC: 682 U/L — HIGH (ref 40–120)
ALT FLD-CCNC: 162 U/L — HIGH (ref 4–33)
ANION GAP SERPL CALC-SCNC: 12 MMOL/L — SIGNIFICANT CHANGE UP (ref 7–14)
AST SERPL-CCNC: 124 U/L — HIGH (ref 4–32)
BILIRUB DIRECT SERPL-MCNC: 0.4 MG/DL — HIGH (ref 0–0.3)
BILIRUB INDIRECT FLD-MCNC: 0.2 MG/DL — SIGNIFICANT CHANGE UP (ref 0–1)
BILIRUB SERPL-MCNC: 0.6 MG/DL — SIGNIFICANT CHANGE UP (ref 0.2–1.2)
BUN SERPL-MCNC: 17 MG/DL — SIGNIFICANT CHANGE UP (ref 7–23)
CALCIUM SERPL-MCNC: 8.3 MG/DL — LOW (ref 8.4–10.5)
CHLORIDE SERPL-SCNC: 104 MMOL/L — SIGNIFICANT CHANGE UP (ref 98–107)
CO2 SERPL-SCNC: 24 MMOL/L — SIGNIFICANT CHANGE UP (ref 22–31)
CREAT SERPL-MCNC: 0.93 MG/DL — SIGNIFICANT CHANGE UP (ref 0.5–1.3)
EGFR: 63 ML/MIN/1.73M2 — SIGNIFICANT CHANGE UP
EGFR: 63 ML/MIN/1.73M2 — SIGNIFICANT CHANGE UP
GLUCOSE BLDC GLUCOMTR-MCNC: 117 MG/DL — HIGH (ref 70–99)
GLUCOSE BLDC GLUCOMTR-MCNC: 178 MG/DL — HIGH (ref 70–99)
GLUCOSE BLDC GLUCOMTR-MCNC: 187 MG/DL — HIGH (ref 70–99)
GLUCOSE BLDC GLUCOMTR-MCNC: 251 MG/DL — HIGH (ref 70–99)
GLUCOSE SERPL-MCNC: 92 MG/DL — SIGNIFICANT CHANGE UP (ref 70–99)
HCT VFR BLD CALC: 24 % — LOW (ref 34.5–45)
HGB BLD-MCNC: 8 G/DL — LOW (ref 11.5–15.5)
MAGNESIUM SERPL-MCNC: 1.8 MG/DL — SIGNIFICANT CHANGE UP (ref 1.6–2.6)
MCHC RBC-ENTMCNC: 26.8 PG — LOW (ref 27–34)
MCHC RBC-ENTMCNC: 33.3 G/DL — SIGNIFICANT CHANGE UP (ref 32–36)
MCV RBC AUTO: 80.5 FL — SIGNIFICANT CHANGE UP (ref 80–100)
NRBC # BLD AUTO: 0 K/UL — SIGNIFICANT CHANGE UP (ref 0–0)
NRBC # FLD: 0 K/UL — SIGNIFICANT CHANGE UP (ref 0–0)
NRBC BLD AUTO-RTO: 0 /100 WBCS — SIGNIFICANT CHANGE UP (ref 0–0)
PHOSPHATE SERPL-MCNC: 2.6 MG/DL — SIGNIFICANT CHANGE UP (ref 2.5–4.5)
PLATELET # BLD AUTO: 502 K/UL — HIGH (ref 150–400)
POTASSIUM SERPL-MCNC: 4 MMOL/L — SIGNIFICANT CHANGE UP (ref 3.5–5.3)
POTASSIUM SERPL-SCNC: 4 MMOL/L — SIGNIFICANT CHANGE UP (ref 3.5–5.3)
PROT SERPL-MCNC: 5.5 G/DL — LOW (ref 6–8.3)
RBC # BLD: 2.98 M/UL — LOW (ref 3.8–5.2)
RBC # FLD: 17.7 % — HIGH (ref 10.3–14.5)
SODIUM SERPL-SCNC: 140 MMOL/L — SIGNIFICANT CHANGE UP (ref 135–145)
WBC # BLD: 13.05 K/UL — HIGH (ref 3.8–10.5)
WBC # FLD AUTO: 13.05 K/UL — HIGH (ref 3.8–10.5)

## 2025-05-15 PROCEDURE — 99232 SBSQ HOSP IP/OBS MODERATE 35: CPT | Mod: FS,GC

## 2025-05-15 RX ORDER — SOD PHOS DI, MONO/K PHOS MONO 250 MG
1 TABLET ORAL ONCE
Refills: 0 | Status: COMPLETED | OUTPATIENT
Start: 2025-05-15 | End: 2025-05-15

## 2025-05-15 RX ORDER — MAGNESIUM SULFATE 500 MG/ML
1 SYRINGE (ML) INJECTION ONCE
Refills: 0 | Status: COMPLETED | OUTPATIENT
Start: 2025-05-15 | End: 2025-05-15

## 2025-05-15 RX ADMIN — INSULIN LISPRO 3: 100 INJECTION, SOLUTION INTRAVENOUS; SUBCUTANEOUS at 12:01

## 2025-05-15 RX ADMIN — Medication 25 GRAM(S): at 05:35

## 2025-05-15 RX ADMIN — Medication 1000 UNIT(S): at 11:18

## 2025-05-15 RX ADMIN — Medication 1 APPLICATION(S): at 11:17

## 2025-05-15 RX ADMIN — Medication 975 MILLIGRAM(S): at 17:20

## 2025-05-15 RX ADMIN — Medication 100 GRAM(S): at 10:15

## 2025-05-15 RX ADMIN — ATORVASTATIN CALCIUM 40 MILLIGRAM(S): 80 TABLET, FILM COATED ORAL at 22:09

## 2025-05-15 RX ADMIN — HEPARIN SODIUM 5000 UNIT(S): 1000 INJECTION INTRAVENOUS; SUBCUTANEOUS at 13:23

## 2025-05-15 RX ADMIN — Medication 975 MILLIGRAM(S): at 05:35

## 2025-05-15 RX ADMIN — Medication 81 MILLIGRAM(S): at 11:21

## 2025-05-15 RX ADMIN — HEPARIN SODIUM 5000 UNIT(S): 1000 INJECTION INTRAVENOUS; SUBCUTANEOUS at 05:35

## 2025-05-15 RX ADMIN — INSULIN LISPRO 1: 100 INJECTION, SOLUTION INTRAVENOUS; SUBCUTANEOUS at 22:08

## 2025-05-15 RX ADMIN — SERTRALINE 100 MILLIGRAM(S): 100 TABLET, FILM COATED ORAL at 11:18

## 2025-05-15 RX ADMIN — Medication 975 MILLIGRAM(S): at 11:21

## 2025-05-15 RX ADMIN — INSULIN LISPRO 1: 100 INJECTION, SOLUTION INTRAVENOUS; SUBCUTANEOUS at 17:22

## 2025-05-15 RX ADMIN — Medication 25 GRAM(S): at 22:09

## 2025-05-15 RX ADMIN — Medication 25 GRAM(S): at 13:20

## 2025-05-15 RX ADMIN — Medication 1 TABLET(S): at 10:15

## 2025-05-15 RX ADMIN — HEPARIN SODIUM 5000 UNIT(S): 1000 INJECTION INTRAVENOUS; SUBCUTANEOUS at 22:09

## 2025-05-15 NOTE — CHART NOTE - NSCHARTNOTEFT_GEN_A_CORE
NUTRITION FOLLOW UP NOTE    Pt seen for ICU transfer nutrition follow-up.    SOURCE: [X] Patient [X] Medical record [X] RN/PCA  [X] Other: B team    Medical Course: Per chart review, "74yo F with Hx HTN, COPD, DM, and recent BLE stents (on ASA/PVX, started 4/24) who presented to [facility] on 5/9 with 1 day of abdominal pain and nausea, found to have a distended, inflamed GB c/f acute cholecystitis on CT. Her hospital course c/b rising leukocytosis (WBC 13.6 > 20.3 > 38.6). She was transferred to [facility] for surgical intervention. S/P Percutaneous cholecystostomy 5/12.      Diet Prescription: Diet, Consistent Carbohydrate w/Evening Snack (05-13-25 @ 09:34)      Food Allergy/Intolerance: [facility]    Nutrition Course: Met with patient at bedside. Reports decreased appetite during hospitalization. Did not have anything at dinner. Patient with untouched breakfast tray. PO intake encouraged and she states will have some breakfast. Patient denies any nausea/vomiting or difficulty chewing and swallowing. +Diarrhea this am likely contributed by Zosyn. Continue small frequent po intake as tolerated encouraged. Strategies to increase kcal and protein intake discussed.     Pertinent Medications: MEDICATIONS  (STANDING):  acetaminophen     Tablet .. 975 milliGRAM(s) Oral every 6 hours  aspirin  chewable 81 milliGRAM(s) Oral daily  atorvastatin 40 milliGRAM(s) Oral at bedtime  chlorhexidine 2% Cloths 1 Application(s) Topical daily  cholecalciferol 1000 Unit(s) Oral daily  heparin   Injectable 5000 Unit(s) SubCutaneous every 8 hours  insulin lispro (ADMELOG) corrective regimen sliding scale   SubCutaneous Before meals and at bedtime  piperacillin/tazobactam IVPB.. 3.375 Gram(s) IV Intermittent every 8 hours  sertraline 100 milliGRAM(s) Oral daily    MEDICATIONS  (PRN):  oxyCODONE    IR 2.5 milliGRAM(s) Oral every 4 hours PRN Moderate Pain (4 - 6)  oxyCODONE    IR 5 milliGRAM(s) Oral every 4 hours PRN Severe Pain (7 - 10)      Pertinent Labs: 05-15 Na140 mmol/L Glu 92 mg/dL K+ 4.0 mmol/L Cr  0.93 mg/dL BUN 17 mg/dL 05-15 Phos 2.6 mg/dL 05-15 Alb 2.7 g/dL[L]     A1C with Estimated Average Glucose Result: 6.4 % (05-12-25 @ 05:30)    POCT Blood Glucose.: 117 mg/dL (15 May 2025 08:09)  POCT Blood Glucose.: 102 mg/dL (14 May 2025 21:55)  POCT Blood Glucose.: 131 mg/dL (14 May 2025 17:34)  POCT Blood Glucose.: 91 mg/dL (14 May 2025 12:07)    Weight (kg): daily-64.9    dosing-63 (05-12 @ 14:59)  Weight Assessment: Weight change likely fluid/scale related discrepancy. Patient with 1+ b/l hand edema per nursing flowsheets.  Height: 62in / 157.5cm  IBW: 110lbs +10%=121lbs/55kg  BMI: 25.4kg/m^2 based on lowest weight 63kg    Physical Assessment, per nursing flowsheets:  Edema: 1+ b/l hand edema per nursing flowsheets.   Pressure Injury: No pressure ulcers/DTI noted in flowsheets.       Estimated Needs:     [X ] recalculated, with consideration for non-critical care, based on IBW-55kg  1650-1925daily @ 30-35kcal/kg,  66-83gm protein daily @ 1.2-1.5gm/kg       Previous Nutrition Diagnosis:   [X ] Inadequate Protein-Energy Intake   Nutrition Diagnosis is [X ] ongoing  [ ] resolved [ ] not applicable   New Nutrition Diagnosis: [X] Increased nutrient needs related to physiological demand for nutrients As evidenced by S/P Percutaneous cholecystostomy     Education:    [X ] Provided on previous assessment by RD- reinforced this visit.    Interventions:   1) Continue current diet order, which remains appropriate at this time.   2) Recommend add Glucerna Therapeutic Nutrition 240mls 2x daily (440kcals, 20g protein) for added calories and protein.  3) Consider anti-diarrheal as warranted.   4) Please document % PO intake in nursing flowsheet.   5) Obtain weekly weights.   6) Please Encourage po intake, assist with meals and menu selections, provide alternatives PRN.       Monitor & Evaluate:  1) Monitor weights, labs, BM's, skin integrity, p.o. intake.   2) Please Encourage po intake, assist with meals and menu selections, provide alternatives PRN.       Fly Bolton RD, CDN, MS pager [phone]  Also available on [employer] Teams

## 2025-05-15 NOTE — PROGRESS NOTE ADULT - ATTENDING COMMENTS
The patient was seen and examined, chart and notes reviewed.  The current diagnosis, plan of care and alternatives have been discussed with the patient.  All questions have been answered and updates have been discussed.  The case was discussed with B team residents/PA's and medical students at morning B surgical rounds and throughout the course of the day.    Acute cholecystitis  a.  S/P IR percutaneous cholecystostomy  b.  Diet as tolerated  c.  Wean IVF  d. Continue V abx empirically  e.  Plan for interval laparoscopic cholecystectomy. Risks, benefits and alternatives to the proposed procedure have been discussed with patient  f.  Trend LFTs
The patient was seen and examined, chart and notes reviewed.  The current diagnosis, plan of care and alternatives have been discussed with the patient.  All questions have been answered and updates have been discussed.  The case was discussed with B team residents/PA's and medical students at morning B surgical rounds and throughout the course of the day.    Acute cholecystitis S/P IR percutaneous cholecystostomy    a.  Remains afebrile, WBC improved  b.  Diet as tolerated  c.  Wean IVF  d. Continue IV abx empirically. Follow up culture  e.  Plan for interval laparoscopic cholecystectomy. Risks, benefits and alternatives to the proposed procedure have been discussed with patient  f.  Trend LFTs .
The patient was seen and examined, chart and notes reviewed.  The current diagnosis, plan of care and alternatives have been discussed with the patient.  All questions have been answered and updates have been discussed.  The case was discussed with B team residents/PA's and medical students at morning B surgical rounds and throughout the course of the day.    Acute cholecystitis S/P IR percutaneous cholecystostomy    a.  Remains afebrile, WBC improved  b.  Diet as tolerated  c.  Wean IVF  d. Continue IV abx empirically. Folow up culture  e.  Plan for interval laparoscopic cholecystectomy. Risks, benefits and alternatives to the proposed procedure have been discussed with patient  f.  Trend LFTs .    Hypokalemia, hypomagensemia  a.  Replete K, Mg
I agree with the detailed interval history, physical, and plan, which I have reviewed and edited where appropriate'; also agree with notes/assessment with my team on service.  I have personally examined the patient.  I was physically present for the key portions of the evaluation and management (E/M) service provided.  I reviewed all the pertinent data.  The patient is a critical care patient with life threatening hemodynamic and metabolic instability in SICU.  The SICU team has a constant risk benefit analyzes discussion and coordinating care with the primary team and all consultants.   The patient is in SICU with the chief complaint and diagnosis mentioned in the note.   The plan will be specified in the note.  75F HTN, COPD, DM, and recent BL LE stents with acute cholecystitis and sepsis in SICU for HD monitoring.  AO3  Lung clear  Heart RR  Abd softly dist    PLAN:  NEURO:  -Tylenol,  -Dilaudid  RESPIRATORY:  - Maintain SpO2 >92%  - RA  CARDIOVASCULAR:  - Maintain MAP >65  GI/NUTRITION:  - Diet: Advance as tolerated  /RENAL:  - D5 1/2NS w/ K @ 75  VASCULAR/HEME:  - SCDs   - ASA   INFECTIOUS DISEASE:  - Zosyn  - Trend WBC  ENDOCRINE:  - FU glucose   DISPO: DC

## 2025-05-15 NOTE — PROVIDER CONTACT NOTE (OTHER) - ASSESSMENT
AO x 4. Denies abdominal pain. Pt had 3 episodes of BM of similar characteristic from previous shift.

## 2025-05-15 NOTE — CHART NOTE - NSCHARTNOTEFT_GEN_A_CORE
Patient is s/p BLE stents (on ASA/PVX, started 4/24) in [place]. Called Dr. Burrell's office at [phone] and talked to TERRI Thornton in regards to restarting plavix after the perc kris since the drain had blood tinged output. TERRI Thornton states restarting plavix is up to our discretion and would like us for to restart it asap as long as it's safe for us to do so. Patient is s/p BLE stents (on ASA/PVX, started 4/24) in [facility]. Called Dr. Burrell's office at [phone] and talked to TERRI Thornton in regards to restarting plavix after the perc kris since the drain had blood tinged output. TERRI Thornton states restarting plavix is up to our discretion but would like us for to restart it asap as long as it's safe for us to do so.

## 2025-05-16 ENCOUNTER — TRANSCRIPTION ENCOUNTER (OUTPATIENT)
Age: 75
End: 2025-05-16

## 2025-05-16 VITALS
SYSTOLIC BLOOD PRESSURE: 156 MMHG | HEART RATE: 86 BPM | DIASTOLIC BLOOD PRESSURE: 60 MMHG | OXYGEN SATURATION: 97 % | TEMPERATURE: 98 F | RESPIRATION RATE: 18 BRPM

## 2025-05-16 LAB
ALBUMIN SERPL ELPH-MCNC: 2.7 G/DL — LOW (ref 3.3–5)
ALP SERPL-CCNC: 584 U/L — HIGH (ref 40–120)
ALT FLD-CCNC: 113 U/L — HIGH (ref 4–33)
ANION GAP SERPL CALC-SCNC: 11 MMOL/L — SIGNIFICANT CHANGE UP (ref 7–14)
AST SERPL-CCNC: 83 U/L — HIGH (ref 4–32)
BILIRUB DIRECT SERPL-MCNC: 0.3 MG/DL — SIGNIFICANT CHANGE UP (ref 0–0.3)
BILIRUB INDIRECT FLD-MCNC: 0.2 MG/DL — SIGNIFICANT CHANGE UP (ref 0–1)
BILIRUB SERPL-MCNC: 0.5 MG/DL — SIGNIFICANT CHANGE UP (ref 0.2–1.2)
BUN SERPL-MCNC: 17 MG/DL — SIGNIFICANT CHANGE UP (ref 7–23)
CALCIUM SERPL-MCNC: 8.1 MG/DL — LOW (ref 8.4–10.5)
CHLORIDE SERPL-SCNC: 103 MMOL/L — SIGNIFICANT CHANGE UP (ref 98–107)
CO2 SERPL-SCNC: 25 MMOL/L — SIGNIFICANT CHANGE UP (ref 22–31)
CREAT SERPL-MCNC: 0.76 MG/DL — SIGNIFICANT CHANGE UP (ref 0.5–1.3)
EGFR: 81 ML/MIN/1.73M2 — SIGNIFICANT CHANGE UP
EGFR: 81 ML/MIN/1.73M2 — SIGNIFICANT CHANGE UP
GLUCOSE BLDC GLUCOMTR-MCNC: 165 MG/DL — HIGH (ref 70–99)
GLUCOSE BLDC GLUCOMTR-MCNC: 346 MG/DL — HIGH (ref 70–99)
GLUCOSE SERPL-MCNC: 136 MG/DL — HIGH (ref 70–99)
HCT VFR BLD CALC: 21.9 % — LOW (ref 34.5–45)
HGB BLD-MCNC: 7.2 G/DL — LOW (ref 11.5–15.5)
MAGNESIUM SERPL-MCNC: 1.7 MG/DL — SIGNIFICANT CHANGE UP (ref 1.6–2.6)
MCHC RBC-ENTMCNC: 26.7 PG — LOW (ref 27–34)
MCHC RBC-ENTMCNC: 32.9 G/DL — SIGNIFICANT CHANGE UP (ref 32–36)
MCV RBC AUTO: 81.1 FL — SIGNIFICANT CHANGE UP (ref 80–100)
NRBC # BLD AUTO: 0 K/UL — SIGNIFICANT CHANGE UP (ref 0–0)
NRBC # FLD: 0 K/UL — SIGNIFICANT CHANGE UP (ref 0–0)
NRBC BLD AUTO-RTO: 0 /100 WBCS — SIGNIFICANT CHANGE UP (ref 0–0)
PHOSPHATE SERPL-MCNC: 2.5 MG/DL — SIGNIFICANT CHANGE UP (ref 2.5–4.5)
PLATELET # BLD AUTO: 452 K/UL — HIGH (ref 150–400)
POTASSIUM SERPL-MCNC: 3.6 MMOL/L — SIGNIFICANT CHANGE UP (ref 3.5–5.3)
POTASSIUM SERPL-SCNC: 3.6 MMOL/L — SIGNIFICANT CHANGE UP (ref 3.5–5.3)
PROT SERPL-MCNC: 5.3 G/DL — LOW (ref 6–8.3)
RBC # BLD: 2.7 M/UL — LOW (ref 3.8–5.2)
RBC # FLD: 17.7 % — HIGH (ref 10.3–14.5)
SODIUM SERPL-SCNC: 139 MMOL/L — SIGNIFICANT CHANGE UP (ref 135–145)
WBC # BLD: 11.62 K/UL — HIGH (ref 3.8–10.5)
WBC # FLD AUTO: 11.62 K/UL — HIGH (ref 3.8–10.5)

## 2025-05-16 RX ORDER — CLOPIDOGREL BISULFATE 75 MG/1
1 TABLET, FILM COATED ORAL
Refills: 0 | DISCHARGE

## 2025-05-16 RX ORDER — MAGNESIUM SULFATE 500 MG/ML
1 SYRINGE (ML) INJECTION ONCE
Refills: 0 | Status: COMPLETED | OUTPATIENT
Start: 2025-05-16 | End: 2025-05-16

## 2025-05-16 RX ORDER — SOD PHOS DI, MONO/K PHOS MONO 250 MG
1 TABLET ORAL ONCE
Refills: 0 | Status: COMPLETED | OUTPATIENT
Start: 2025-05-16 | End: 2025-05-16

## 2025-05-16 RX ADMIN — Medication 975 MILLIGRAM(S): at 05:47

## 2025-05-16 RX ADMIN — INSULIN LISPRO 1: 100 INJECTION, SOLUTION INTRAVENOUS; SUBCUTANEOUS at 08:50

## 2025-05-16 RX ADMIN — INSULIN LISPRO 4: 100 INJECTION, SOLUTION INTRAVENOUS; SUBCUTANEOUS at 11:31

## 2025-05-16 RX ADMIN — Medication 81 MILLIGRAM(S): at 11:27

## 2025-05-16 RX ADMIN — Medication 40 MILLIEQUIVALENT(S): at 09:59

## 2025-05-16 RX ADMIN — Medication 25 GRAM(S): at 05:47

## 2025-05-16 RX ADMIN — Medication 1 TABLET(S): at 09:59

## 2025-05-16 RX ADMIN — HEPARIN SODIUM 5000 UNIT(S): 1000 INJECTION INTRAVENOUS; SUBCUTANEOUS at 05:47

## 2025-05-16 RX ADMIN — Medication 100 GRAM(S): at 09:59

## 2025-05-16 RX ADMIN — Medication 1000 UNIT(S): at 11:28

## 2025-05-16 RX ADMIN — SERTRALINE 100 MILLIGRAM(S): 100 TABLET, FILM COATED ORAL at 11:28

## 2025-05-16 NOTE — CHART NOTE - NSCHARTNOTESELECT_GEN_ALL_CORE
Event Note
Incidiental
Post op check
Transfer Note
Event Note
Nutrition Follow-up Note/Nutrition Services
Point of care ultrasound

## 2025-05-16 NOTE — PROGRESS NOTE ADULT - ASSESSMENT
74yo F with Hx HTN, COPD, DM, and recent BLE stents (on ASA/PVX, started 4/24) who presented to [facility] on 5/9 with 1 day of abdominal pain and nausea, found to have a distended, inflamed GB c/f acute cholecystitis on CT. Her hospital course c/b rising leukocytosis (WBC 13.6 > 20.3 > 38.6). She was transferred to [facility] for surgical intervention. Perc kris on 5/12    PLAN:   - Diet: CLD  - IVF @ 75  - Abx: Zosyn  - Formal RUQ u/s pending  - ASA and SQH  - Excellent care per SICU      B Team Surgery  #[id number] 
76yo F with Hx HTN, COPD, DM, and recent BLE stents (on ASA/PVX, started 4/24) who presented to [facility] on 5/9 with 1 day of abdominal pain and nausea, found to have a distended, inflamed GB c/f acute cholecystitis on CT. Her hospital course c/b rising leukocytosis (WBC 13.6 > 20.3 > 38.6). She was transferred to [facility] for surgical intervention. Perc kris on 5/12    PLAN:   - pain control  - Diet: Reg diet  - IR drain care. Flush 5ml q24hr.   - Abx: Zosyn  - Replete electrolytes prn.  - ASA and SQH  - Discharge home if tolerating reg diet      B Team Surgery  #[id number]   
76yo F with Hx HTN, COPD, DM, and recent BLE stents (on ASA/PVX, started 4/24) who presented to [facility] on 5/9 with 1 day of abdominal pain and nausea, found to have a distended, inflamed GB c/f acute cholecystitis on CT. Her hospital course c/b rising leukocytosis (WBC 13.6 > 20.3 > 38.6). She was transferred to [facility] for surgical intervention. Perc kris on 5/12    PLAN:   - pain control  - Diet: Reg diet  - IR drain care. Flush 5ml q24hr.   - Abx: Zosyn inpatient, augmentin for home   - Replete electrolytes prn.  - ASA and SQH  - dc today       B Team Surgery  #[id number] 
74yo F with Hx HTN, COPD, DM, and recent BLE stents (on ASA/PVX, started 4/24) who presented to [facility] on 5/9 with 1 day of abdominal pain and nausea, found to have a distended, inflamed GB c/f acute cholecystitis on CT. Her hospital course c/b rising leukocytosis (WBC 13.6 > 20.3 > 38.6). She was transferred to [facility] for surgical intervention. Perc kris on 5/12    PLAN:   - pain control  - Diet: Reg diet  - IR drain care. Flush 5ml q24hr.   - Abx: Zosyn  - Replete electrolytes prn.  - ASA and SQH  - Discharge home if tolerating reg diet      B Team Surgery  #[id number]   
75F HTN, COPD, DM, and recent BL LE stents (on ASA/PVX, started 4/24) who was transferred from [facility] for acute cholecystitis. She presented initially on 5/9 with 1 day of abdominal pain and nausea, found to have leukocytosis (WBC 20.3) and CT findings of a distended, inflamed GB c/f acute cholecystitis. During her hospital admission, her WBC became elevated from 13.6 > 20.3 > 38.6. She was transferred to [facility] for surgical intervention. IR consulted for Percutaneous cholecystostomy. Now s/p Cholecystostomy tube placement on 5/12 in Interventional Radiology.     Plan:  - Continue drainage  - Monitor output  - Flush drain 5 cc NS qd  - If no surgical Intervention, will need routine 3 months exchanges. Please call IR office [phone]    [phone]  
75F HTN, COPD, DM, and recent BL LE stents (on ASA/PVX, started 4/24) who was transferred from [facility] for acute cholecystitis with concern for sepsis. Patient currently hemodynamically stable and afebrile.     PLAN:  NEURO:  - Pain control: Tylenol, dilaudid    RESPIRATORY:  - Maintain SpO2 >92%  - On RA  - CT chest for screening WNL    CARDIOVASCULAR:  - Maintain MAP >65  - TTE 5/12 with EF 76%  - No pressor requirements    GI/NUTRITION:  - Diet: Advance as tolerated  - RUQ US 5/12: Gallbladder is distended with irregular wall thickening  - CT AP 5/12: Gangrenous choleycystitis  - S/p perc cholecystotomy with IR 5/12    /RENAL:  - D5 1/2NS w/ K @ 75  - Voiding  - Strict I/Os    VASCULAR/HEME:  - DVT ppx: SCDs   - ASA 81  - Resume plavix 48 hours post-IR procedure    INFECTIOUS DISEASE:  - Zosyn  - Trend WBC    ENDOCRINE:  - POC glucose q6h  - TAQUERIA  - Holding home metformin, glimepiride     LINES/TUBES/DRAINS:  - PIV    DISPO: SICU

## 2025-05-16 NOTE — DISCHARGE NOTE NURSING/CASE MANAGEMENT/SOCIAL WORK - FINANCIAL ASSISTANCE
[facility] provides services at a reduced cost to those who are determined to be eligible through [facility]’s financial assistance program. Information regarding [facility]’s financial assistance program can be found by going to [email or web address] or by calling [phone].

## 2025-05-16 NOTE — DISCHARGE NOTE NURSING/CASE MANAGEMENT/SOCIAL WORK - PATIENT PORTAL LINK FT
You can access the FollowMyHealth Patient Portal offered by [facility] by registering at the following website: [email or web address]. By joining [facility]’s FollowMyHealth portal, you will also be able to view your health information using other applications (apps) compatible with our system.

## 2025-05-16 NOTE — DISCHARGE NOTE NURSING/CASE MANAGEMENT/SOCIAL WORK - NSDCFUADDAPPT_GEN_ALL_CORE_FT
Please restart Plavix on Monday 5/19. Please follow up with your vascular surgeon.     If no surgical Intervention, will need routine 3 months exchanges. Please call IR office [phone]

## 2025-05-16 NOTE — PROGRESS NOTE ADULT - SUBJECTIVE AND OBJECTIVE BOX
TEAM SURGERY PROGRESS NOTE    SUBJECTIVE: Patient seen and examined at bedside on AM rounds, patient without complaints. Resting comfortably in bed. Reports nausea, but no vomiting. Denies abd pain, chest pain, sob.     Vital Signs Last 24 Hrs  T(C): 37.3 (14 May 2025 09:46), Max: 37.3 (14 May 2025 09:46)  T(F): 99.1 (14 May 2025 09:46), Max: 99.1 (14 May 2025 09:46)  HR: 85 (14 May 2025 09:46) (70 - 85)  BP: 132/72 (14 May 2025 09:46) (132/72 - 156/64)  BP(mean): 85 (13 May 2025 20:00) (85 - 95)  RR: 16 (14 May 2025 09:46) (13 - 17)  SpO2: 96% (14 May 2025 09:46) (95% - 99%)    Parameters below as of 14 May 2025 05:15  Patient On (Oxygen Delivery Method): room air      I&O's Detail    13 May 2025 07:01  -  14 May 2025 07:00  --------------------------------------------------------  IN:    dextrose 5% + sodium chloride 0.45% w/ Additives: 150 mL    IV PiggyBack: 100 mL    Oral Fluid: 880 mL  Total IN: 1130 mL    OUT:    Drain (mL): 50 mL    Voided (mL): 1850 mL  Total OUT: 1900 mL    Total NET: -770 mL      14 May 2025 07:01  -  14 May 2025 12:47  --------------------------------------------------------  IN:    IV PiggyBack: 100 mL    Oral Fluid: 250 mL  Total IN: 350 mL    OUT:    Drain (mL): 15 mL  Total OUT: 15 mL    Total NET: 335 mL        MEDICATIONS  (STANDING):  acetaminophen     Tablet .. 975 milliGRAM(s) Oral every 6 hours  aspirin  chewable 81 milliGRAM(s) Oral daily  atorvastatin 40 milliGRAM(s) Oral at bedtime  chlorhexidine 2% Cloths 1 Application(s) Topical daily  cholecalciferol 1000 Unit(s) Oral daily  heparin   Injectable 5000 Unit(s) SubCutaneous every 8 hours  insulin lispro (ADMELOG) corrective regimen sliding scale   SubCutaneous Before meals and at bedtime  piperacillin/tazobactam IVPB.. 3.375 Gram(s) IV Intermittent every 8 hours  potassium phosphate / sodium phosphate Tablet (K-PHOS No. 2) 1 Tablet(s) Oral two times a day  sertraline 100 milliGRAM(s) Oral daily    MEDICATIONS  (PRN):  oxyCODONE    IR 2.5 milliGRAM(s) Oral every 4 hours PRN Moderate Pain (4 - 6)  oxyCODONE    IR 5 milliGRAM(s) Oral every 4 hours PRN Severe Pain (7 - 10)      Physical Exam  Constitutional: A&Ox3, NAD  Respiratory: Unlabored breathing, 2L NC  Abdomen: Soft, nondistended, NTTP. IR w/ light brown output. No rebound or guarding. +/-  Extremities: WWP, EISENBERG spontaneously  LABS:                        7.7    12.71 )-----------( 462      ( 14 May 2025 06:00 )             23.5     05-14    139  |  102  |  16  ----------------------------<  91  3.2[L]   |  24  |  1.03    Ca    7.6[L]      14 May 2025 06:00  Phos  2.1     05-14  Mg     1.50     05-14    TPro  5.3[L]  /  Alb  2.5[L]  /  TBili  0.8  /  DBili  x   /  AST  228[H]  /  ALT  225[H]  /  AlkPhos  679[H]  05-14      Urinalysis Basic - ( 14 May 2025 06:00 )    Color: x / Appearance: x / SG: x / pH: x  Gluc: 91 mg/dL / Ketone: x  / Bili: x / Urobili: x   Blood: x / Protein: x / Nitrite: x   Leuk Esterase: x / RBC: x / WBC x   Sq Epi: x / Non Sq Epi: x / Bacteria: x          Patient is a 75y Female  
75F HTN, COPD, DM, and recent BL LE stents (on ASA/PVX, started 4/24) who was transferred from [facility] for acute cholecystitis. She presented initially on 5/9 with 1 day of abdominal pain and nausea, found to have leukocytosis (WBC 20.3) and CT findings of a distended, inflamed GB c/f acute cholecystitis. During her hospital admission, her WBC became elevated from 13.6 > 20.3 > 38.6. She was transferred to [facility] for surgical intervention. IR consulted for Percutaneous cholecystostomy. Now s/p Cholecystostomy tube placement on 5/12 in Interventional Radiology.     Patient seen and examined bedside resting comfortably. VSS,  afebrile, denies any pain.    T(F): 97 (05-13-25 @ 12:00), Max: 97.5 (05-12-25 @ 16:00)  HR: 79 (05-13-25 @ 12:00) (52 - 79)  BP: 110/51 (05-13-25 @ 08:00) (110/51 - 146/57)  RR: 11 (05-13-25 @ 12:00) (11 - 21)  SpO2: 99% (05-13-25 @ 12:00) (96% - 100%)    LABS:                      8.1    21.39 )-----------( 381      ( 13 May 2025 00:30 )             24.6     05-13    135  |  98  |  21  ----------------------------<  128[H]  3.3[L]   |  24  |  1.32[H]    Ca    7.8[L]      13 May 2025 00:30  Phos  2.9     05-13  Mg     2.00     05-13  TPro  5.4[L]  /  Alb  2.5[L]  /  TBili  2.1[H]  /  DBili  2.0[H]  /  AST  515[H]  /  ALT  293[H]  /  AlkPhos  826[H]  05-13  PT/INR - ( 12 May 2025 05:30 )   PT: 17.6 sec;   INR: 1.52 ratio    PTT - ( 12 May 2025 05:30 )  PTT:32.4 sec  I&O's Detail    12 May 2025 07:01  -  13 May 2025 07:00  --------------------------------------------------------  IN:    dextrose 5% + sodium chloride 0.45% w/ Additives: 975 mL    IV PiggyBack: 400 mL    Lactated Ringers: 600 mL    Oral Fluid: 220 mL  Total IN: 2195 mL    OUT:    Drain (mL): 40 mL  Voided (mL): 1250 mL  Total OUT: 1290 mL    Total NET: 905 mL      13 May 2025 07:01  -  13 May 2025 13:24  --------------------------------------------------------  IN:    dextrose 5% + sodium chloride 0.45% w/ Additives: 150 mL    Oral Fluid: 100 mL  Total IN: 250 mL    OUT:    Voided (mL): 750 mL  Total OUT: 750 mL    Total NET: -500 mL    PHYSICAL EXAM:  General: Nontoxic, in NAD  Cholecystostomy Tube: dressing c/d/i, draining dark bile with blood tinged, flushed with 5cc NS without resistance.      
  INTERVAL EVENTS: IR perc kris  SUBJECTIVE: Patient seen and examined at bedside with surgical team, patient without complaints. Denies fever, chills, CP, SOB nausea, vomiting, abdominal pain.    OBJECTIVE:    Vital Signs Last 24 Hrs  T(C): 35.8 (13 May 2025 04:00), Max: 36.4 (12 May 2025 16:00)  T(F): 96.4 (13 May 2025 04:00), Max: 97.5 (12 May 2025 16:00)  HR: 62 (13 May 2025 07:00) (52 - 71)  BP: 124/47 (13 May 2025 07:00) (116/46 - 146/57)  BP(mean): 71 (13 May 2025 07:00) (66 - 83)  RR: 11 (13 May 2025 07:00) (11 - 21)  SpO2: 100% (13 May 2025 07:00) (96% - 100%)    Parameters below as of 13 May 2025 07:00  Patient On (Oxygen Delivery Method): nasal cannula  O2 Flow (L/min): 2  I&O's Detail    12 May 2025 07:01  -  13 May 2025 07:00  --------------------------------------------------------  IN:    dextrose 5% + sodium chloride 0.45% w/ Additives: 975 mL    IV PiggyBack: 400 mL    Lactated Ringers: 600 mL    Oral Fluid: 220 mL  Total IN: 2195 mL    OUT:    Drain (mL): 40 mL    Voided (mL): 1250 mL  Total OUT: 1290 mL    Total NET: 905 mL      MEDICATIONS  (STANDING):  acetaminophen   IVPB .. 1000 milliGRAM(s) IV Intermittent every 6 hours  aspirin  chewable 81 milliGRAM(s) Oral daily  atorvastatin 40 milliGRAM(s) Oral at bedtime  chlorhexidine 2% Cloths 1 Application(s) Topical daily  cholecalciferol 1000 Unit(s) Oral daily  dextrose 5% + sodium chloride 0.45% with potassium chloride 20 mEq/L 1000 milliLiter(s) (75 mL/Hr) IV Continuous <Continuous>  heparin   Injectable 5000 Unit(s) SubCutaneous every 8 hours  insulin lispro (ADMELOG) corrective regimen sliding scale   SubCutaneous every 6 hours  piperacillin/tazobactam IVPB.. 3.375 Gram(s) IV Intermittent every 8 hours  sertraline 100 milliGRAM(s) Oral daily    MEDICATIONS  (PRN):  HYDROmorphone  Injectable 0.2 milliGRAM(s) IV Push every 4 hours PRN Moderate Pain (4 - 6)  HYDROmorphone  Injectable 0.5 milliGRAM(s) IV Push every 4 hours PRN Severe Pain (7 - 10)      PHYSICAL EXAM:  Constitutional: A&Ox3, NAD  Respiratory: Unlabored breathing, 2L NC  Abdomen: Soft, nondistended, NTTP. IR w/ light brown output. No rebound or guarding. +/-  Extremities: WWP, EISENBERG spontaneously    LABS:                        8.1    21.39 )-----------( 381      ( 13 May 2025 00:30 )             24.6     05-13    135  |  98  |  21  ----------------------------<  128[H]  3.3[L]   |  24  |  1.32[H]    Ca    7.8[L]      13 May 2025 00:30  Phos  2.9     05-13  Mg     2.00     05-13    TPro  5.4[L]  /  Alb  2.5[L]  /  TBili  2.1[H]  /  DBili  2.0[H]  /  AST  515[H]  /  ALT  293[H]  /  AlkPhos  826[H]  05-13    PT/INR - ( 12 May 2025 05:30 )   PT: 17.6 sec;   INR: 1.52 ratio         PTT - ( 12 May 2025 05:30 )  PTT:32.4 sec  LIVER FUNCTIONS - ( 13 May 2025 00:30 )  Alb: 2.5 g/dL / Pro: 5.4 g/dL / ALK PHOS: 826 U/L / ALT: 293 U/L / AST: 515 U/L / GGT: x           Urinalysis Basic - ( 13 May 2025 00:30 )    Color: x / Appearance: x / SG: x / pH: x  Gluc: 128 mg/dL / Ketone: x  / Bili: x / Urobili: x   Blood: x / Protein: x / Nitrite: x   Leuk Esterase: x / RBC: x / WBC x   Sq Epi: x / Non Sq Epi: x / Bacteria: x      ABO Interpretation: O (05-13-25 @ 00:49)      
  INTERVAL EVENTS: No acute events overnight.  SUBJECTIVE: Patient seen and examined at bedside with surgical team, patient without complaints. Denies fever, chills, CP, SOB nausea, vomiting, abdominal pain.    OBJECTIVE:    Vital Signs Last 24 Hrs  T(C): 36.6 (15 May 2025 05:35), Max: 37.3 (14 May 2025 09:46)  T(F): 97.9 (15 May 2025 05:35), Max: 99.1 (14 May 2025 09:46)  HR: 86 (15 May 2025 05:35) (75 - 86)  BP: 158/71 (15 May 2025 05:35) (107/60 - 187/67)  BP(mean): --  RR: 17 (15 May 2025 05:35) (16 - 18)  SpO2: 99% (15 May 2025 05:35) (96% - 99%)    Parameters below as of 15 May 2025 05:35  Patient On (Oxygen Delivery Method): room air    I&O's Detail    14 May 2025 07:01  -  15 May 2025 07:00  --------------------------------------------------------  IN:    IV PiggyBack: 400 mL    Oral Fluid: 850 mL  Total IN: 1250 mL    OUT:    Drain (mL): 72.5 mL    Voided (mL): 550 mL  Total OUT: 622.5 mL    Total NET: 627.5 mL      MEDICATIONS  (STANDING):  acetaminophen     Tablet .. 975 milliGRAM(s) Oral every 6 hours  aspirin  chewable 81 milliGRAM(s) Oral daily  atorvastatin 40 milliGRAM(s) Oral at bedtime  chlorhexidine 2% Cloths 1 Application(s) Topical daily  cholecalciferol 1000 Unit(s) Oral daily  heparin   Injectable 5000 Unit(s) SubCutaneous every 8 hours  insulin lispro (ADMELOG) corrective regimen sliding scale   SubCutaneous Before meals and at bedtime  piperacillin/tazobactam IVPB.. 3.375 Gram(s) IV Intermittent every 8 hours  sertraline 100 milliGRAM(s) Oral daily    MEDICATIONS  (PRN):  oxyCODONE    IR 2.5 milliGRAM(s) Oral every 4 hours PRN Moderate Pain (4 - 6)  oxyCODONE    IR 5 milliGRAM(s) Oral every 4 hours PRN Severe Pain (7 - 10)      PHYSICAL EXAM:  Constitutional: A&Ox3, NAD  Respiratory: Unlabored breathing  Abdomen: Soft, nondistended, NTTP. IR w/ blood tinged output. No rebound or guarding. +/-  Extremities: WWP, EISENBERG spontaneously      LABS:                        8.0    13.05 )-----------( 502      ( 15 May 2025 05:42 )             24.0     05-14    139  |  102  |  16  ----------------------------<  91  3.2[L]   |  24  |  1.03    Ca    7.6[L]      14 May 2025 06:00  Phos  2.1     05-14  Mg     1.50     05-14    TPro  5.3[L]  /  Alb  2.5[L]  /  TBili  0.8  /  DBili  x   /  AST  228[H]  /  ALT  225[H]  /  AlkPhos  679[H]  05-14      LIVER FUNCTIONS - ( 14 May 2025 06:00 )  Alb: 2.5 g/dL / Pro: 5.3 g/dL / ALK PHOS: 679 U/L / ALT: 225 U/L / AST: 228 U/L / GGT: x           Urinalysis Basic - ( 14 May 2025 06:00 )    Color: x / Appearance: x / SG: x / pH: x  Gluc: 91 mg/dL / Ketone: x  / Bili: x / Urobili: x   Blood: x / Protein: x / Nitrite: x   Leuk Esterase: x / RBC: x / WBC x   Sq Epi: x / Non Sq Epi: x / Bacteria: x          
SICU Daily Progress Note  =====================================================  Interval/Overnight Events:       - S/p perc cholecystotomy with IR, 50 cc bilious fluid drained  - Per IR, may resume ASA immediately and plavix 48 hours post-procedure  - Advance diet as tolerated  - D5 1/2 NS w/ K at 75cc/hr while PO intake remains low    ALLERGIES:  No Known Allergies      --------------------------------------------------------------------------------------    MEDICATIONS:    Neurologic Medications  acetaminophen   IVPB .. 1000 milliGRAM(s) IV Intermittent every 6 hours  acetaminophen   IVPB .. 1000 milliGRAM(s) IV Intermittent every 6 hours  HYDROmorphone  Injectable 0.2 milliGRAM(s) IV Push every 4 hours PRN Moderate Pain (4 - 6)  HYDROmorphone  Injectable 0.5 milliGRAM(s) IV Push every 4 hours PRN Severe Pain (7 - 10)  sertraline 100 milliGRAM(s) Oral daily    Respiratory Medications    Cardiovascular Medications    Gastrointestinal Medications  cholecalciferol 1000 Unit(s) Oral daily  dextrose 5% + sodium chloride 0.45% with potassium chloride 20 mEq/L 1000 milliLiter(s) IV Continuous <Continuous>    Genitourinary Medications    Hematologic/Oncologic Medications  aspirin  chewable 81 milliGRAM(s) Oral daily    Antimicrobial/Immunologic Medications  piperacillin/tazobactam IVPB.. 3.375 Gram(s) IV Intermittent every 8 hours    Endocrine/Metabolic Medications  atorvastatin 40 milliGRAM(s) Oral at bedtime  insulin lispro (ADMELOG) corrective regimen sliding scale   SubCutaneous every 6 hours    Topical/Other Medications  chlorhexidine 2% Cloths 1 Application(s) Topical daily    --------------------------------------------------------------------------------------    VITAL SIGNS:  T(C): 36.1 (05-13-25 @ 00:00), Max: 37.3 (05-12-25 @ 04:45)  HR: 57 (05-13-25 @ 00:00) (52 - 75)  BP: 134/48 (05-13-25 @ 00:00) (116/46 - 146/57)  RR: 12 (05-13-25 @ 00:00) (11 - 18)  SpO2: 97% (05-13-25 @ 00:00) (94% - 100%)  --------------------------------------------------------------------------------------    INS AND OUTS:    05-11-25 @ 07:01  -  05-12-25 @ 07:00  --------------------------------------------------------  IN: 350 mL / OUT: 0 mL / NET: 350 mL    05-12-25 @ 07:01  -  05-13-25 @ 00:11  --------------------------------------------------------  IN: 1425 mL / OUT: 1220 mL / NET: 205 mL      --------------------------------------------------------------------------------------    EXAM    NEURO: NAD, A&O x 3  HEENT: NC/AT  RESPIRATORY: nonlabored respirations, normal CW expansion  CARDIO: RRR, S1S2  ABDOMEN: softly distended, appropriately tender, IR drain with minimal dark brown/reddish output  EXTREMITIES: normal strength, no deformities    --------------------------------------------------------------------------------------    LABS    PENDING  --------------------------------------------------------------------------------------
Surgery Progress Note:    OVERNIGHT EVENTS: NAEO    SUBJECTIVE: Pt seen and examined at bedside. Patient comfortable and in no-apparent distress. Pain is controlled.     MEDICATIONS  (STANDING):  acetaminophen     Tablet .. 975 milliGRAM(s) Oral every 6 hours  aspirin  chewable 81 milliGRAM(s) Oral daily  atorvastatin 40 milliGRAM(s) Oral at bedtime  chlorhexidine 2% Cloths 1 Application(s) Topical daily  cholecalciferol 1000 Unit(s) Oral daily  heparin   Injectable 5000 Unit(s) SubCutaneous every 8 hours  insulin lispro (ADMELOG) corrective regimen sliding scale   SubCutaneous Before meals and at bedtime  piperacillin/tazobactam IVPB.. 3.375 Gram(s) IV Intermittent every 8 hours  sertraline 100 milliGRAM(s) Oral daily    MEDICATIONS  (PRN):  oxyCODONE    IR 2.5 milliGRAM(s) Oral every 4 hours PRN Moderate Pain (4 - 6)  oxyCODONE    IR 5 milliGRAM(s) Oral every 4 hours PRN Severe Pain (7 - 10)    T(C): 36.6 (05-16-25 @ 05:45), Max: 36.9 (05-15-25 @ 10:00)  HR: 74 (05-16-25 @ 05:45) (72 - 84)  BP: 159/64 (05-16-25 @ 05:45) (142/70 - 175/68)  RR: 18 (05-16-25 @ 05:45) (18 - 19)  SpO2: 99% (05-16-25 @ 05:45) (96% - 99%)    05-15-25 @ 07:01  -  05-16-25 @ 07:00  --------------------------------------------------------  IN: 1150 mL / OUT: 52.5 mL / NET: 1097.5 mL      LABS:                        7.2    11.62 )-----------( 452      ( 16 May 2025 06:07 )             21.9     05-16    139  |  103  |  17  ----------------------------<  136[H]  3.6   |  25  |  0.76    Ca    8.1[L]      16 May 2025 06:07  Phos  2.5     05-16  Mg     1.70     05-16    TPro  5.3[L]  /  Alb  2.7[L]  /  TBili  0.5  /  DBili  0.3  /  AST  83[H]  /  ALT  113[H]  /  AlkPhos  584[H]  05-16      Urinalysis Basic - ( 16 May 2025 06:07 )    Color: x / Appearance: x / SG: x / pH: x  Gluc: 136 mg/dL / Ketone: x  / Bili: x / Urobili: x   Blood: x / Protein: x / Nitrite: x   Leuk Esterase: x / RBC: x / WBC x   Sq Epi: x / Non Sq Epi: x / Bacteria: x      PE:  Constitutional: A&Ox3, NAD  Respiratory: Unlabored breathing  Abdomen: Soft, nondistended, NTTP. IR w/ blood tinged output. No rebound or guarding.  Extremities: WWP, EISENBERG spontaneously

## 2025-05-16 NOTE — DISCHARGE NOTE NURSING/CASE MANAGEMENT/SOCIAL WORK - NSSCNAMETXT_GEN_ALL_CORE
Home care referral was sent to [facility]. The visiting RN will call to schedule a visit for the day after discharge.

## 2025-05-17 LAB
CULTURE RESULTS: SIGNIFICANT CHANGE UP
SPECIMEN SOURCE: SIGNIFICANT CHANGE UP

## 2025-06-09 ENCOUNTER — INPATIENT (INPATIENT)
Facility: HOSPITAL | Age: 75
LOS: 2 days | Discharge: HOME HEALTH | End: 2025-06-12
Attending: INTERNAL MEDICINE | Admitting: INTERNAL MEDICINE
Payer: MEDICARE

## 2025-06-09 VITALS
WEIGHT: 123.9 LBS | OXYGEN SATURATION: 98 % | SYSTOLIC BLOOD PRESSURE: 106 MMHG | RESPIRATION RATE: 18 BRPM | HEART RATE: 88 BPM | DIASTOLIC BLOOD PRESSURE: 46 MMHG | TEMPERATURE: 98 F | HEIGHT: 62 IN

## 2025-06-09 DIAGNOSIS — Z98.890 OTHER SPECIFIED POSTPROCEDURAL STATES: Chronic | ICD-10-CM

## 2025-06-09 DIAGNOSIS — I10 ESSENTIAL (PRIMARY) HYPERTENSION: ICD-10-CM

## 2025-06-09 DIAGNOSIS — F32.9 MAJOR DEPRESSIVE DISORDER, SINGLE EPISODE, UNSPECIFIED: ICD-10-CM

## 2025-06-09 DIAGNOSIS — Z87.19 PERSONAL HISTORY OF OTHER DISEASES OF THE DIGESTIVE SYSTEM: ICD-10-CM

## 2025-06-09 DIAGNOSIS — Z98.89 OTHER SPECIFIED POSTPROCEDURAL STATES: Chronic | ICD-10-CM

## 2025-06-09 DIAGNOSIS — Z98.41 CATARACT EXTRACTION STATUS, RIGHT EYE: Chronic | ICD-10-CM

## 2025-06-09 DIAGNOSIS — N17.9 ACUTE KIDNEY FAILURE, UNSPECIFIED: ICD-10-CM

## 2025-06-09 DIAGNOSIS — Z29.9 ENCOUNTER FOR PROPHYLACTIC MEASURES, UNSPECIFIED: ICD-10-CM

## 2025-06-09 DIAGNOSIS — E11.9 TYPE 2 DIABETES MELLITUS WITHOUT COMPLICATIONS: ICD-10-CM

## 2025-06-09 DIAGNOSIS — R06.09 OTHER FORMS OF DYSPNEA: ICD-10-CM

## 2025-06-09 DIAGNOSIS — I73.9 PERIPHERAL VASCULAR DISEASE, UNSPECIFIED: ICD-10-CM

## 2025-06-09 DIAGNOSIS — E78.5 HYPERLIPIDEMIA, UNSPECIFIED: ICD-10-CM

## 2025-06-09 DIAGNOSIS — D64.9 ANEMIA, UNSPECIFIED: ICD-10-CM

## 2025-06-09 DIAGNOSIS — R06.02 SHORTNESS OF BREATH: ICD-10-CM

## 2025-06-09 LAB
ADD ON TEST-SPECIMEN IN LAB: SIGNIFICANT CHANGE UP
ALBUMIN SERPL ELPH-MCNC: 3.6 G/DL — SIGNIFICANT CHANGE UP (ref 3.3–5)
ALP SERPL-CCNC: 118 U/L — SIGNIFICANT CHANGE UP (ref 40–120)
ALT FLD-CCNC: 7 U/L — SIGNIFICANT CHANGE UP (ref 4–33)
ANION GAP SERPL CALC-SCNC: 19 MMOL/L — HIGH (ref 7–14)
AST SERPL-CCNC: 12 U/L — SIGNIFICANT CHANGE UP (ref 4–32)
BASOPHILS # BLD AUTO: 0.14 K/UL — SIGNIFICANT CHANGE UP (ref 0–0.2)
BASOPHILS NFR BLD AUTO: 1.1 % — SIGNIFICANT CHANGE UP (ref 0–2)
BILIRUB SERPL-MCNC: 0.2 MG/DL — SIGNIFICANT CHANGE UP (ref 0.2–1.2)
BUN SERPL-MCNC: 33 MG/DL — HIGH (ref 7–23)
CALCIUM SERPL-MCNC: 9.9 MG/DL — SIGNIFICANT CHANGE UP (ref 8.4–10.5)
CHLORIDE SERPL-SCNC: 102 MMOL/L — SIGNIFICANT CHANGE UP (ref 98–107)
CO2 SERPL-SCNC: 21 MMOL/L — LOW (ref 22–31)
CREAT SERPL-MCNC: 1.4 MG/DL — HIGH (ref 0.5–1.3)
EGFR: 39 ML/MIN/1.73M2 — LOW
EGFR: 39 ML/MIN/1.73M2 — LOW
EOSINOPHIL # BLD AUTO: 0.36 K/UL — SIGNIFICANT CHANGE UP (ref 0–0.5)
EOSINOPHIL NFR BLD AUTO: 3 % — SIGNIFICANT CHANGE UP (ref 0–6)
GLUCOSE SERPL-MCNC: 88 MG/DL — SIGNIFICANT CHANGE UP (ref 70–99)
HCT VFR BLD CALC: 23.2 % — LOW (ref 34.5–45)
HGB BLD-MCNC: 7.1 G/DL — LOW (ref 11.5–15.5)
IANC: 8.89 K/UL — HIGH (ref 1.8–7.4)
IMM GRANULOCYTES NFR BLD AUTO: 1.1 % — HIGH (ref 0–0.9)
LYMPHOCYTES # BLD AUTO: 1.7 K/UL — SIGNIFICANT CHANGE UP (ref 1–3.3)
LYMPHOCYTES # BLD AUTO: 14 % — SIGNIFICANT CHANGE UP (ref 13–44)
MCHC RBC-ENTMCNC: 24.3 PG — LOW (ref 27–34)
MCHC RBC-ENTMCNC: 30 G/DL — LOW (ref 32–36)
MCV RBC AUTO: 80.9 FL — SIGNIFICANT CHANGE UP (ref 80–100)
MONOCYTES # BLD AUTO: 0.96 K/UL — HIGH (ref 0–0.9)
MONOCYTES NFR BLD AUTO: 7.9 % — SIGNIFICANT CHANGE UP (ref 2–14)
NEUTROPHILS # BLD AUTO: 8.89 K/UL — HIGH (ref 1.8–7.4)
NEUTROPHILS NFR BLD AUTO: 72.9 % — SIGNIFICANT CHANGE UP (ref 43–77)
NRBC # BLD AUTO: 0 K/UL — SIGNIFICANT CHANGE UP (ref 0–0)
NRBC # FLD: 0 K/UL — SIGNIFICANT CHANGE UP (ref 0–0)
NRBC BLD AUTO-RTO: 0 /100 WBCS — SIGNIFICANT CHANGE UP (ref 0–0)
NT-PROBNP SERPL-SCNC: 1834 PG/ML — HIGH
PLATELET # BLD AUTO: 391 K/UL — SIGNIFICANT CHANGE UP (ref 150–400)
POTASSIUM SERPL-MCNC: 4.4 MMOL/L — SIGNIFICANT CHANGE UP (ref 3.5–5.3)
POTASSIUM SERPL-SCNC: 4.4 MMOL/L — SIGNIFICANT CHANGE UP (ref 3.5–5.3)
PROT SERPL-MCNC: 7.1 G/DL — SIGNIFICANT CHANGE UP (ref 6–8.3)
RBC # BLD: 2.88 M/UL — LOW (ref 3.8–5.2)
RBC # FLD: 17.4 % — HIGH (ref 10.3–14.5)
SODIUM SERPL-SCNC: 142 MMOL/L — SIGNIFICANT CHANGE UP (ref 135–145)
TROPONIN T, HIGH SENSITIVITY RESULT: 14 NG/L — SIGNIFICANT CHANGE UP
TROPONIN T, HIGH SENSITIVITY RESULT: 15 NG/L — SIGNIFICANT CHANGE UP
WBC # BLD: 12.18 K/UL — HIGH (ref 3.8–10.5)
WBC # FLD AUTO: 12.18 K/UL — HIGH (ref 3.8–10.5)

## 2025-06-09 PROCEDURE — 76770 US EXAM ABDO BACK WALL COMP: CPT | Mod: 26

## 2025-06-09 PROCEDURE — 99285 EMERGENCY DEPT VISIT HI MDM: CPT

## 2025-06-09 PROCEDURE — 99223 1ST HOSP IP/OBS HIGH 75: CPT | Mod: GC

## 2025-06-09 PROCEDURE — 71275 CT ANGIOGRAPHY CHEST: CPT | Mod: 26

## 2025-06-09 PROCEDURE — 71046 X-RAY EXAM CHEST 2 VIEWS: CPT | Mod: 26

## 2025-06-09 RX ORDER — SODIUM CHLORIDE 9 G/1000ML
1000 INJECTION, SOLUTION INTRAVENOUS
Refills: 0 | Status: DISCONTINUED | OUTPATIENT
Start: 2025-06-09 | End: 2025-06-12

## 2025-06-09 RX ORDER — GLUCAGON 3 MG/1
1 POWDER NASAL ONCE
Refills: 0 | Status: DISCONTINUED | OUTPATIENT
Start: 2025-06-09 | End: 2025-06-12

## 2025-06-09 RX ORDER — DEXTROSE 50 % IN WATER 50 %
15 SYRINGE (ML) INTRAVENOUS ONCE
Refills: 0 | Status: DISCONTINUED | OUTPATIENT
Start: 2025-06-09 | End: 2025-06-12

## 2025-06-09 RX ORDER — ASPIRIN 325 MG
81 TABLET ORAL DAILY
Refills: 0 | Status: DISCONTINUED | OUTPATIENT
Start: 2025-06-09 | End: 2025-06-12

## 2025-06-09 RX ORDER — DEXTROSE 50 % IN WATER 50 %
25 SYRINGE (ML) INTRAVENOUS ONCE
Refills: 0 | Status: DISCONTINUED | OUTPATIENT
Start: 2025-06-09 | End: 2025-06-12

## 2025-06-09 RX ORDER — INSULIN LISPRO 100 U/ML
INJECTION, SOLUTION INTRAVENOUS; SUBCUTANEOUS
Refills: 0 | Status: DISCONTINUED | OUTPATIENT
Start: 2025-06-09 | End: 2025-06-11

## 2025-06-09 RX ORDER — METOPROLOL SUCCINATE 50 MG/1
50 TABLET, EXTENDED RELEASE ORAL DAILY
Refills: 0 | Status: DISCONTINUED | OUTPATIENT
Start: 2025-06-09 | End: 2025-06-09

## 2025-06-09 RX ORDER — ACETAMINOPHEN 500 MG/5ML
650 LIQUID (ML) ORAL EVERY 6 HOURS
Refills: 0 | Status: DISCONTINUED | OUTPATIENT
Start: 2025-06-09 | End: 2025-06-12

## 2025-06-09 RX ORDER — DEXTROSE 50 % IN WATER 50 %
12.5 SYRINGE (ML) INTRAVENOUS ONCE
Refills: 0 | Status: DISCONTINUED | OUTPATIENT
Start: 2025-06-09 | End: 2025-06-12

## 2025-06-09 RX ORDER — LIDOCAINE HYDROCHLORIDE 20 MG/ML
1 JELLY TOPICAL ONCE
Refills: 0 | Status: COMPLETED | OUTPATIENT
Start: 2025-06-09 | End: 2025-06-09

## 2025-06-09 RX ORDER — MELATONIN 5 MG
3 TABLET ORAL AT BEDTIME
Refills: 0 | Status: DISCONTINUED | OUTPATIENT
Start: 2025-06-09 | End: 2025-06-12

## 2025-06-09 RX ORDER — SERTRALINE 100 MG/1
100 TABLET, FILM COATED ORAL DAILY
Refills: 0 | Status: DISCONTINUED | OUTPATIENT
Start: 2025-06-09 | End: 2025-06-12

## 2025-06-09 RX ORDER — METOPROLOL SUCCINATE 50 MG/1
50 TABLET, EXTENDED RELEASE ORAL DAILY
Refills: 0 | Status: DISCONTINUED | OUTPATIENT
Start: 2025-06-09 | End: 2025-06-12

## 2025-06-09 RX ORDER — CLOPIDOGREL BISULFATE 75 MG/1
75 TABLET, FILM COATED ORAL DAILY
Refills: 0 | Status: DISCONTINUED | OUTPATIENT
Start: 2025-06-09 | End: 2025-06-12

## 2025-06-09 RX ORDER — ATORVASTATIN CALCIUM 80 MG/1
40 TABLET, FILM COATED ORAL AT BEDTIME
Refills: 0 | Status: DISCONTINUED | OUTPATIENT
Start: 2025-06-09 | End: 2025-06-12

## 2025-06-09 RX ORDER — INSULIN LISPRO 100 U/ML
INJECTION, SOLUTION INTRAVENOUS; SUBCUTANEOUS AT BEDTIME
Refills: 0 | Status: DISCONTINUED | OUTPATIENT
Start: 2025-06-09 | End: 2025-06-11

## 2025-06-09 RX ORDER — ACETAMINOPHEN 500 MG/5ML
975 LIQUID (ML) ORAL ONCE
Refills: 0 | Status: COMPLETED | OUTPATIENT
Start: 2025-06-09 | End: 2025-06-09

## 2025-06-09 RX ADMIN — LIDOCAINE HYDROCHLORIDE 1 PATCH: 20 JELLY TOPICAL at 21:25

## 2025-06-09 RX ADMIN — Medication 975 MILLIGRAM(S): at 18:59

## 2025-06-09 RX ADMIN — ATORVASTATIN CALCIUM 40 MILLIGRAM(S): 80 TABLET, FILM COATED ORAL at 22:10

## 2025-06-09 NOTE — H&P ADULT - PROBLEM SELECTOR PLAN 2
- Normocytic anemia on admission to ED: Hgb 7,1, MCV 80.9.  - Recent episode of black stool  - No recent colonoscopy or endoscopy    PLAN  - Consent for transfusion, give packed RBCs   - Goal of Hgb 8 or above  - Iron studies  - Consult GI for workup of occult blood loss - Normocytic anemia on admission to ED: Hgb 7,1, MCV 80.9.  - Recent episode of black stool  - No recent colonoscopy or endoscopy    PLAN  - Consent for transfusion, give 1u  packed RBCs   - Goal of Hgb 8 or above  - Iron studies  - Consider consult GI for workup of occult blood loss - Normocytic anemia on admission to ED: Hgb 7,1, MCV 80.9.   - Recent episode of black stool? though limited historian regarding this as told other providers no black stool  - No recent colonoscopy or endoscopy    PLAN  - Consent for transfusion, give 1u  packed RBCs   - Goal of Hgb 8 or above  - Iron studies  - GI consult

## 2025-06-09 NOTE — H&P ADULT - PROBLEM SELECTOR PLAN 10
DVT Prophylaxis: Heparin sub-Q  Diet: DASH  Status:   Dispo: Pending Clinical Course DVT Prophylaxis: Heparin sub-Q  Diet: DASH  Dispo: Pending Clinical Course

## 2025-06-09 NOTE — H&P ADULT - NSHPPHYSICALEXAM_GEN_ALL_CORE
T(C): 36.2 (06-09-25 @ 16:34), Max: 36.6 (06-09-25 @ 12:40)  HR: 71 (06-09-25 @ 16:34) (71 - 88)  BP: 128/51 (06-09-25 @ 16:34) (99/68 - 128/51)  RR: 18 (06-09-25 @ 16:34) (18 - 18)  SpO2: 99% (06-09-25 @ 16:34) (98% - 99%)    CONSTITUTIONAL: Well groomed, no apparent distress  EYES: PERRLA and symmetric, EOMI, No conjunctival or scleral injection, non-icteric  ENMT: Oral mucosa with moist membranes. Normal dentition; no pharyngeal injection or exudates  NECK: Supple, symmetric and without tracheal deviation   RESP: No respiratory distress, no use of accessory muscles; CTA b/l, no WRR  CV: RRR, +S1S2, no MRG; no JVD; no peripheral edema  GI: Soft, NT, ND, no rebound, no guarding; no palpable masses; no hepatosplenomegaly; no hernia palpated  LYMPH: No cervical LAD or tenderness; no axillary LAD or tenderness; no inguinal LAD or tenderness  MSK: Normal gait; No digital clubbing or cyanosis; examination of the (head/neck/spine/ribs/pelvis, RUE, LUE, RLE, LLE) without misalignment,            Normal ROM without pain, no spinal tenderness, normal muscle strength/tone  SKIN: No rashes or ulcers noted; no subcutaneous nodules or induration palpable  NEURO: CN II-XII intact; normal reflexes in upper and lower extremities, sensation intact in upper and lower extremities b/l to light touch   PSYCH: Appropriate insight/judgment; A+O x 3, mood and affect appropriate, recent/remote memory intact T(C): 36.2 (06-09-25 @ 16:34), Max: 36.6 (06-09-25 @ 12:40)  HR: 71 (06-09-25 @ 16:34) (71 - 88)  BP: 128/51 (06-09-25 @ 16:34) (99/68 - 128/51)  RR: 18 (06-09-25 @ 16:34) (18 - 18)  SpO2: 99% (06-09-25 @ 16:34) (98% - 99%)    CONSTITUTIONAL: Well groomed, no apparent distress  EYES: PERRLA and symmetric, EOMI, Pale conjunctiva, no scleral injection, non-icteric  ENMT: Oral mucosa with moist membranes. Poor dentition (missing teeth); no pharyngeal injection or exudates  NECK: Supple, symmetric and without tracheal deviation   RESP: No respiratory distress, no use of accessory muscles; CTA b/l, no WRR  CV: RRR, +S1S2, no MRG; no JVD; no peripheral edema, weak peripheral pulses  GI: Soft, NT, ND, no rebound, no guarding; no palpable masses; no hepatosplenomegaly; no hernia palpated. S/p perc kris drain. ~ 20 mL output, no signs of infection: no erythema, drainage, non-tender  LYMPH: No cervical LAD or tenderness; no axillary LAD or tenderness; no inguinal LAD or tenderness  MSK: Grossly normal  SKIN: No rashes or ulcers noted; no subcutaneous nodules or induration palpable  NEURO: No focal deficits  PSYCH: Appropriate insight/judgment; A+O x 3, depressed mood, recent/remote memory intact

## 2025-06-09 NOTE — H&P ADULT - PROBLEM SELECTOR PLAN 1
- several weeks of dyspnea on exertion, overwhelming fatigue  - substernal chest pain with exertion - non-radiating, never at rest  - PMH: HTN, HLD, PAD s/p stents, T2DM, Negative cardiac stress test 1.5 years ago, TTE 5.12.25: grade 1 diastolic dysfunction, EF 76% hyperdynamic   - EKG in ED: NSR, mild ST depressions  - pro-BNP 1,834, trop 14  - CXR, CTA in ED 6.9.25 unremarkable  - Hgb 7.1 in ED on admission    PLAN:  - Monitor on Tele  - Bedside cardiac US  - TTE   - Monitor pro-BNP and trops  - Anemia treatment as below  - DAVID treatment as below - several weeks of dyspnea on exertion, overwhelming fatigue  - clinically euvolemic on exam  - substernal chest pain with exertion - non-radiating, never at rest  - PMH: HTN, HLD, PAD s/p stents, T2DM, Negative cardiac stress test 1.5 years ago, TTE 5.12.25: grade 1 diastolic dysfunction, EF 76% hyperdynamic   - EKG in ED: NSR, mild ST depressions  - pro-BNP 1,834, trop 14  - CXR, CTA in ED 6.9.25 unremarkable  - Hgb 7.1 in ED on admission    PLAN:  - Monitor on Tele  - Bedside cardiac US  - Repeat trop  - Anemia treatment as below  - DAVID treatment as below Hx HTN, HLD, PAD s/p stents, T2DM, Negative cardiac stress test 1.5 years ago, TTE 5.12.25: grade 1 diastolic dysfunction, EF 76% hyperdynamic   [place] TORRE and CP with exertion (not with rest) could be related to symptomatic anemia vs CAD. Euvolemic on exam and CT without edema  - EKG in ED: NSR, mild ST depressions  - pro-BNP 1,834, trop 14, repeat 15  - CXR, CTA in ED 6.9.25 unremarkable  - Hgb 7.1 in ED on admission    PLAN:  - Monitor on Tele  - transfuse 1u PRBC and if sx not improved with this, would consult cardiology in AM for ischemic eval and possible repeat echo.  - Recent TTE 5/12/25 and unremarkable. Hold off on repeating for now  - Anemia treatment as below  - DAVID treatment as below

## 2025-06-09 NOTE — H&P ADULT - NSHPSOCIALHISTORY_GEN_ALL_CORE
Lives alone in [place], with her son (who is a PA) who lives close by. She currently smokes cigarettes and has a 60 pack-year history, hasn't smoked since her perc kris. She does not drink alcohol.

## 2025-06-09 NOTE — ED ADULT NURSE REASSESSMENT NOTE - NS ED NURSE REASSESS COMMENT FT1
Break RN: Patient awake and resting in stretcher; respirations even and unlabored, no signs/symptoms of acute distress. Patient denies dyspnea, shortness of breath, and chest pain. Patient denies pain and offers no complaints at this time. 22G IV placed to right AC, labs collected and sent, pt placed on telemetry monitor, pt NSR, safety measures in place and family at bedside.

## 2025-06-09 NOTE — H&P ADULT - ASSESSMENT
75 year old woman PMH HTN, PAD s/p leg stenting, recent Perc kris presenting with weeks of chest pain and dyspnea on exertion. for the past few weeks she has had chest pain that radiates to her back when she exerts herself, also associated with shortness of breath, but no chest pain at rest 75 year old woman PMH HTN, PAD s/p leg stenting, recent Perc kris presenting to the ED with weeks of substernal chest pain that occasionally radiates to the back and dyspnea on exertion, suspicions for stable angina and symptomatic anemia. Significant labs on admission: (Hgb 7.1) with DAVID (pre-renal, BUN 33/Cr 1.4, eGFR 39), pro-BNP 1834, troponin 14. CXR and CTA unremarkable.

## 2025-06-09 NOTE — H&P ADULT - HISTORY OF PRESENT ILLNESS
75 year old woman PMH HTN, PAD s/p leg stenting, recent Perc kris presenting with weeks of chest pain and dyspnea on exertion. for the past few weeks she has had chest pain that radiates to her back when she exerts herself, also associated with shortness of breath, but no chest pain at rest. denies fevers, chills, abd pain, n/v/d/c, dysuria, leg swelling. Saw a cardiologist last year and had a negative stress test. Sent in by PCP because her BP was elevated along with these symptoms Linsey Haywood is a 75-year old woman with PMH, HTN, T2DM, PAD s/p bilateral leg stenting, recent perc kris on 5.12.25 presenting with a chronic several-week history of chest pain and dyspnea on exertion. For the past several weeks she has had exertional chest pain that radiates to her back, also associated with shortness of breath. No chest pain or SOB at rest. Has had prior work-up for angina and notes a negative stress test 1.5 years ago. She has been very lethargic and notes that she lays in bed all day, even prior to her perc kris. During this period, her appetite has also been worse, causing her to not eat or drink as much as normal. For the past couple of weeks, she's also felt dizzy upon standing. She recently traveled back from [place] where she was getting ready to get an endoscopy for the workup of dark stool. She denies fevers, chills, abd pain, n/v/d/c, dysuria, leg swelling. She has had no sick contacts. Today she was sent in by PCP due to her symptoms and elevated BP.    She arrived to the ED hemodynamically stable and afebrile, received CXR and CTA both unremarkable. Labs significant for DAVID (pre-renal, BUN 33/Cr 1.4, eGFR 39), normocytic anemia (Hgb 7.1), leukocytosis (WBC 12.18). Glucose 57 (given juice, now 80), pro-BNP 1834, and trop 14. Linsey Haywood is a 75-year old woman with PMH, HTN, T2DM, PAD s/p bilateral leg stenting, recent perc kris on 5.12.25 presenting with a chronic several-week history of chest pain and dyspnea on exertion. For the past several weeks she has had exertional chest pain that radiates to her back, also associated with shortness of breath. No chest pain or SOB at rest. Has had prior work-up for angina and notes a negative stress test 1.5 years ago. She has been very lethargic and notes that she lays in bed all day, even prior to her perc kris. During this period, her appetite has also been worse, causing her to not eat or drink as much as normal. For the past couple of weeks, she's also felt dizzy upon standing. She recently traveled back from [place] where she was getting ready to get an endoscopy for the workup of dark stool. She denies fevers, chills, abd pain, n/v/d/c, dysuria, leg swelling. She has had no sick contacts. Today she was sent in by PCP due to her symptoms and soft BP.    She arrived to the ED hemodynamically stable and afebrile, received CXR and CTA both unremarkable. Labs significant for DAVID (pre-renal, BUN 33/Cr 1.4, eGFR 39), normocytic anemia (Hgb 7.1), leukocytosis (WBC 12.18). Glucose 57 (given juice, now 80), pro-BNP 1834, and trop 14. Admit to medicine for DE LA TORRE workup

## 2025-06-09 NOTE — H&P ADULT - PROBLEM SELECTOR PLAN 9
- s/p perc kris 5.12.25  - On exam, incision site shows no signs of infection: no drainage, erythema, non-tender  - AST, ALT, Alk phos WNL  - Minimal bilious output (~20 mL)    - PLAN  - Outpatient follow-up for perc drain removal with surgery

## 2025-06-09 NOTE — H&P ADULT - PROBLEM SELECTOR PLAN 4
- Known history of PAD s/p two stents  - On aspirin 81 mg, atorvastatin 40mg qd, plavix 75 mg qd  - Weak peripheral pulses    PLAN:  - Continue aspirin, atorvastatin, plavix  - Lipid panel  - LDL goal < 70

## 2025-06-09 NOTE — H&P ADULT - NSHPLABSRESULTS_GEN_ALL_CORE
LABS: When present labs, imaging, and ECG were personally reviewed                             7.1    12.18 )-----------( 391      ( 09 Jun 2025 14:50 )                23.2       06-09    142  |  102  |  33[H]  ----------------------------<  88  4.4   |  21[L]  |  1.40[H]    Ca    9.9      09 Jun 2025 14:50    TPro  7.1  /  Alb  3.6  /  TBili  0.2  /  DBili  x   /  AST  12  /  ALT  7   /  AlkPhos  118  06-09       LIVER FUNCTIONS - ( 09 Jun 2025 14:50 )  Alb: 3.6 g/dL / Pro: 7.1 g/dL / ALK PHOS: 118 U/L / ALT: 7 U/L / AST: 12 U/L / GGT: x                    Urinalysis Basic - ( 09 Jun 2025 14:50 )    Color: x / Appearance: x / SG: x / pH: x  Gluc: 88 mg/dL / Ketone: x  / Bili: x / Urobili: x   Blood: x / Protein: x / Nitrite: x   Leuk Esterase: x / RBC: x / WBC x   Sq Epi: x / Non Sq Epi: x / Bacteria: x          RADIOLOGY & ADDITIONAL TESTS:     < from: CT Angio Chest PE Protocol w/ IV Cont (06.09.25 @ 16:48) >      IMPRESSION:.    No pulmonary embolism.    < end of copied text >

## 2025-06-09 NOTE — H&P ADULT - PROBLEM SELECTOR PLAN 5
- Known history of HTN   - On Hydrochlorothiazide, losartan, metoprolol  - On admission to ED: 106/46    PLAN:  - Hold Hydrochlorothiazide, losartan in setting of DAVID

## 2025-06-09 NOTE — H&P ADULT - ATTENDING COMMENTS
I have personally seen, examined, and participated in the care of this patient.  I have reviewed all pertinent clinical information, including history, physical exam, plan, and the resident's note and agree except as noted.    75 year old woman PMH HTN, PAD s/p leg stenting, recent Perc kris presenting to the ED with weeks of substernal chest pain that occasionally radiates to the back and dyspnea on exertion, suspicions for stable angina and symptomatic anemia.    #Chest pain  #DE LA TORRE  Possibly symptomatic anemia vs stable angina. Patient has risk factors for CAD including HTN, HLD, DM and PAD  -EKG with submm STD in II, AVF, lateral leads  -troponin 14--15 and no active CP. Not ACS  -transfuse 1u PRBC and if sx not improved would obtain cardiology c/s in AM for ischemic eval and possible repeat TTE  -TTE 5/12/25 normal EF, grade 1 diastolic dysfunction  -probnp 1800s but euvolemic on exam    #Anemia  Unclear etiology at this time. R/o occult bleed but denies overt bleeding. No BRBPR or melena per my history.  -add on iron studies  -supposed to have EGD outpatient? unclear hx  -PPI BID for now  -GI consult in AM  -transfuse 1u PRBC given sx    #DAVID  Possibly prerenal  -repeat after unit of blood  -US renal without hydronephrosis  -hold ARB, thiazide  -UA, urine lytes    #PAD  Resume DAPT, statin    #HTN  Hold losartan and thiazide  Metoprolol with hold parameters    #DM2  Hold home orals  ISS, A1C    #HLD  Resume statin    #hx cholecystitis  Has perc kris in place. c/d/i    #MDD  Resume sertraline I have personally seen, examined, and participated in the care of this patient.  I have reviewed all pertinent clinical information, including history, physical exam, plan, and the resident's note and agree except as noted.    75 year old woman PMH HTN, PAD s/p leg stenting, recent Perc kris presenting to the ED with weeks of substernal chest pain that occasionally radiates to the back and dyspnea on exertion, suspicions for stable angina and symptomatic anemia.    #Chest pain  #DE LA TORRE  Possibly symptomatic anemia vs stable angina. Patient has risk factors for CAD including HTN, HLD, DM and PAD  -EKG with submm STD in II, AVF, lateral leads  -troponin 14--15 and no active CP. Not ACS  -transfuse 1u PRBC and if sx not improved would obtain cardiology c/s in AM for ischemic eval and possible repeat TTE  -TTE 5/12/25 normal EF, grade 1 diastolic dysfunction  -probnp 1800s but euvolemic on exam  -CTA neg for PE, PNA    #Anemia  Unclear etiology at this time. R/o occult bleed but denies overt bleeding. No BRBPR or melena per my history.  -add on iron studies  -supposed to have EGD outpatient? unclear hx  -PPI BID for now  -GI consult in AM  -transfuse 1u PRBC given sx    #DAVID  Possibly prerenal  -repeat after unit of blood  -US renal without hydronephrosis  -hold ARB, thiazide  -UA, urine lytes    #PAD  Resume DAPT, statin    #HTN  Hold losartan and thiazide  Metoprolol with hold parameters    #DM2  Hold home orals  ISS, A1C    #HLD  Resume statin    #hx cholecystitis  Has perc kris in place. c/d/i    #MDD  Resume sertraline

## 2025-06-09 NOTE — H&P ADULT - PROBLEM SELECTOR PLAN 6
- History of HLD + PAD s/p stents in bilateral lower extremities  - On aspirin 81 mg, atorvastatin 40mg qd    PLAN:  - Lipid panel   - Continue atorvastatin

## 2025-06-09 NOTE — ED PROVIDER NOTE - ATTENDING CONTRIBUTION TO CARE
I performed a face-to-face evaluation of the patient and performed a history and physical examination. I agree with the history and physical examination. If this was a PA visit, I personally saw the patient with the PA and performed a substantive portion of the visit including all aspects of the medical decision making.    Several hospitalizations. Most recent recently for infected gallbladder, for which he has a drain. He complains one month of shortness of breath with exertion. Consider chronic venous embolism versus ACS. Check CT pulm angiogram. Evaluate for ACS with troponin, EKG.

## 2025-06-09 NOTE — ED ADULT TRIAGE NOTE - CHIEF COMPLAINT QUOTE
Sent by MD for evaluation of chest pain, palpitations, sob, dizziness and weakness. States she has an infected gallbladder and had drain placed 2wks ago. Scheduled surgery. Denies abdominal pain or fevers. hx of DM, HTN, cardiac stents

## 2025-06-09 NOTE — H&P ADULT - PROBLEM SELECTOR PLAN 3
- DAVID on admission (pre-renal, BUN 33/Cr 1.4, eGFR 39)    PLAN  - Urine electrolytes  - KUB   - Hold home losartan, hydrochlorothiazide  - Renally dose drugs, avoid nephrotoxic drugs - DAVID on admission (pre-renal, BUN 33/Cr 1.4, eGFR 39)    PLAN  - Urine electrolytes, UA  - US renal no hydropnephrosis  - Hold home losartan, hydrochlorothiazide  - Renally dose drugs, avoid nephrotoxic drugs

## 2025-06-09 NOTE — ED PROVIDER NOTE - CLINICAL SUMMARY MEDICAL DECISION MAKING FREE TEXT BOX
75 year old woman PMH HTN, PAD s/p leg stenting, recent Perc kris presenting with weeks of chest pain and dyspnea on exertion, denies any pain or SOB at rest, denies any other symptoms, vitals WNL, physical exam benign except for in place perc kris tube, EKG nonischemic concerning for stable angina vs acs vs PNA vs PE, unlikely aortic dissection. cbc, cmp, trop, bnp, cxr, CTA PE, likely admit

## 2025-06-09 NOTE — ED PROVIDER NOTE - NSICDXPASTSURGICALHX_GEN_ALL_CORE_FT
normal (ped)... PAST SURGICAL HISTORY:  H/O arthroscopy of left knee ~15 years ago; Repair of the left meniscus    H/O bilateral cataract extraction ~5 years ago    H/O cystoscopy Pt reports hx of cystoscopy with stent placement "years ago"    Previous back surgery as per patient, "they shaved a bone in my back." ~4-5 years ago with Dr. Gracia

## 2025-06-09 NOTE — ED ADULT NURSE NOTE - OBJECTIVE STATEMENT
A&Ox4. ambulatory. c/o non radiating midline sternum chest pain, midline upper back radiating to posterior neck pain, muffled hearing. NAD. pt denies SOB,  dizziness, weakness, urinary symptoms, HA, n/v/d, fevers, chills. respirations are even and unlabored. ABD is soft and non tender. drain observed to Right side of ABD. skin intact. EKG obtained. safety precautions maintained.

## 2025-06-09 NOTE — ED PROVIDER NOTE - PHYSICAL EXAMINATION
Physical Exam:  Gen: uncomfortable appearing, AOx3, nontoxic appearing  Head: NCAT  HEENT: EOMI, PEERLA, normal conjunctiva, tongue midline, oral mucosa moist  Lung: CTAB, no respiratory distress, no wheezes/rhonchi/rales B/L, speaking in full sentences  CV: RRR, no murmurs, rubs or gallops  Abd: soft, NT, ND, no guarding, no rigidity, no rebound tenderness, no CVA tenderness, perc kris tube in place with tenderness, erythema, warmth  MSK: no visible deformities, ROM normal in UE/LE, no neck / back pain, calf tenderness  Neuro: No focal sensory or motor deficits in cranial nerves, upper and lower extremities  Skin: Warm, well perfused, no rash, no leg swelling  EXT: equal pusles and BPs in both arms

## 2025-06-09 NOTE — ED PROVIDER NOTE - OBJECTIVE STATEMENT
75 year old woman PMH HTN, PAD s/p leg stenting, recent Perc kris presenting with weeks of chest pain and dyspnea on exertion. for the past few weeks she has had chest pain that radiates to her back when she exerts herself, also associated with shortness of breath, but no chest pain at rest. denies fevers, chills, abd pain, n/v/d/c, dysuria, leg swelling. Saw a cardiologist last year and had a negative stress test. Sent in by PCP because her BP was elevated along with these symptoms.

## 2025-06-09 NOTE — H&P ADULT - TIME BILLING
I had a face to face encounter with this patient. I spent 79 total minutes on the bedside interview and examination, coordination of care, counseling, chart review, order placement and documentation for this patient.    This time spent by me is independent of the time spent by the resident managing the patient

## 2025-06-09 NOTE — ED ADULT NURSE REASSESSMENT NOTE - NS ED NURSE REASSESS COMMENT FT1
YULI rodriguez p made aware of pts glucose of 57. pt finished meal and drank 2 8 oz apple juices. NAD. A&Ox4. pt denies SOB, chest pain, dizziness, weakness, urinary symptoms, HA, n/v/d, fevers, chills, pain. respirations are even and unlabored. safety precautions maintained.

## 2025-06-09 NOTE — H&P ADULT - PROBLEM SELECTOR PLAN 7
- Known history of T2DM  - On metformin, glipizide    PLAN:  - Insulin sliding scale + fingersticks TIDAC  - Hgb A1C

## 2025-06-10 LAB
A1C WITH ESTIMATED AVERAGE GLUCOSE RESULT: 5.5 % — SIGNIFICANT CHANGE UP (ref 4–5.6)
ALBUMIN SERPL ELPH-MCNC: 3.5 G/DL — SIGNIFICANT CHANGE UP (ref 3.3–5)
ALP SERPL-CCNC: 111 U/L — SIGNIFICANT CHANGE UP (ref 40–120)
ALT FLD-CCNC: 6 U/L — SIGNIFICANT CHANGE UP (ref 4–33)
ANION GAP SERPL CALC-SCNC: 14 MMOL/L — SIGNIFICANT CHANGE UP (ref 7–14)
AST SERPL-CCNC: 13 U/L — SIGNIFICANT CHANGE UP (ref 4–32)
BASOPHILS # BLD AUTO: 0.12 K/UL — SIGNIFICANT CHANGE UP (ref 0–0.2)
BASOPHILS NFR BLD AUTO: 1.1 % — SIGNIFICANT CHANGE UP (ref 0–2)
BILIRUB SERPL-MCNC: 0.6 MG/DL — SIGNIFICANT CHANGE UP (ref 0.2–1.2)
BLD GP AB SCN SERPL QL: NEGATIVE — SIGNIFICANT CHANGE UP
BUN SERPL-MCNC: 36 MG/DL — HIGH (ref 7–23)
CALCIUM SERPL-MCNC: 9.5 MG/DL — SIGNIFICANT CHANGE UP (ref 8.4–10.5)
CHLORIDE SERPL-SCNC: 102 MMOL/L — SIGNIFICANT CHANGE UP (ref 98–107)
CHOLEST SERPL-MCNC: 79 MG/DL — SIGNIFICANT CHANGE UP
CO2 SERPL-SCNC: 23 MMOL/L — SIGNIFICANT CHANGE UP (ref 22–31)
CREAT SERPL-MCNC: 1.41 MG/DL — HIGH (ref 0.5–1.3)
EGFR: 38 ML/MIN/1.73M2 — LOW
EGFR: 38 ML/MIN/1.73M2 — LOW
EOSINOPHIL # BLD AUTO: 0.56 K/UL — HIGH (ref 0–0.5)
EOSINOPHIL NFR BLD AUTO: 5 % — SIGNIFICANT CHANGE UP (ref 0–6)
ESTIMATED AVERAGE GLUCOSE: 111 — SIGNIFICANT CHANGE UP
FOLATE SERPL-MCNC: 10.1 NG/ML — SIGNIFICANT CHANGE UP (ref 3.1–17.5)
GAS PNL BLDV: SIGNIFICANT CHANGE UP
GLUCOSE BLDC GLUCOMTR-MCNC: 149 MG/DL — HIGH (ref 70–99)
GLUCOSE BLDC GLUCOMTR-MCNC: 186 MG/DL — HIGH (ref 70–99)
GLUCOSE SERPL-MCNC: 103 MG/DL — HIGH (ref 70–99)
HAPTOGLOB SERPL-MCNC: 392 MG/DL — HIGH (ref 34–200)
HCT VFR BLD CALC: 25.6 % — LOW (ref 34.5–45)
HDLC SERPL-MCNC: 27 MG/DL — LOW
HGB BLD-MCNC: 8.2 G/DL — LOW (ref 11.5–15.5)
IANC: 7.53 K/UL — HIGH (ref 1.8–7.4)
IMM GRANULOCYTES NFR BLD AUTO: 0.7 % — SIGNIFICANT CHANGE UP (ref 0–0.9)
LDH SERPL L TO P-CCNC: 118 U/L — LOW (ref 135–225)
LDLC SERPL-MCNC: 29 MG/DL — SIGNIFICANT CHANGE UP
LIPID PNL WITH DIRECT LDL SERPL: 29 MG/DL — SIGNIFICANT CHANGE UP
LYMPHOCYTES # BLD AUTO: 18.2 % — SIGNIFICANT CHANGE UP (ref 13–44)
LYMPHOCYTES # BLD AUTO: 2.04 K/UL — SIGNIFICANT CHANGE UP (ref 1–3.3)
MAGNESIUM SERPL-MCNC: 1.9 MG/DL — SIGNIFICANT CHANGE UP (ref 1.6–2.6)
MCHC RBC-ENTMCNC: 25.1 PG — LOW (ref 27–34)
MCHC RBC-ENTMCNC: 32 G/DL — SIGNIFICANT CHANGE UP (ref 32–36)
MCV RBC AUTO: 78.3 FL — LOW (ref 80–100)
MONOCYTES # BLD AUTO: 0.87 K/UL — SIGNIFICANT CHANGE UP (ref 0–0.9)
MONOCYTES NFR BLD AUTO: 7.8 % — SIGNIFICANT CHANGE UP (ref 2–14)
NEUTROPHILS # BLD AUTO: 7.53 K/UL — HIGH (ref 1.8–7.4)
NEUTROPHILS NFR BLD AUTO: 67.2 % — SIGNIFICANT CHANGE UP (ref 43–77)
NONHDLC SERPL-MCNC: 52 MG/DL — SIGNIFICANT CHANGE UP
NRBC # BLD AUTO: 0 K/UL — SIGNIFICANT CHANGE UP (ref 0–0)
NRBC # FLD: 0 K/UL — SIGNIFICANT CHANGE UP (ref 0–0)
NRBC BLD AUTO-RTO: 0 /100 WBCS — SIGNIFICANT CHANGE UP (ref 0–0)
PHOSPHATE SERPL-MCNC: 4.5 MG/DL — SIGNIFICANT CHANGE UP (ref 2.5–4.5)
PLATELET # BLD AUTO: 347 K/UL — SIGNIFICANT CHANGE UP (ref 150–400)
POTASSIUM SERPL-MCNC: 4.1 MMOL/L — SIGNIFICANT CHANGE UP (ref 3.5–5.3)
POTASSIUM SERPL-SCNC: 4.1 MMOL/L — SIGNIFICANT CHANGE UP (ref 3.5–5.3)
PROT SERPL-MCNC: 6.5 G/DL — SIGNIFICANT CHANGE UP (ref 6–8.3)
RBC # BLD: 2.52 M/UL — LOW (ref 3.8–5.2)
RBC # BLD: 3.27 M/UL — LOW (ref 3.8–5.2)
RBC # FLD: 17.1 % — HIGH (ref 10.3–14.5)
RETICS #: 45.1 K/UL — SIGNIFICANT CHANGE UP (ref 25–125)
RETICS/RBC NFR: 1.8 % — SIGNIFICANT CHANGE UP (ref 0.5–2.5)
RH IG SCN BLD-IMP: POSITIVE — SIGNIFICANT CHANGE UP
SODIUM SERPL-SCNC: 139 MMOL/L — SIGNIFICANT CHANGE UP (ref 135–145)
TRIGL SERPL-MCNC: 128 MG/DL — SIGNIFICANT CHANGE UP
TROPONIN T, HIGH SENSITIVITY RESULT: 14 NG/L — SIGNIFICANT CHANGE UP
VIT B12 SERPL-MCNC: 375 PG/ML — SIGNIFICANT CHANGE UP (ref 200–900)
WBC # BLD: 11.2 K/UL — HIGH (ref 3.8–10.5)
WBC # FLD AUTO: 11.2 K/UL — HIGH (ref 3.8–10.5)

## 2025-06-10 PROCEDURE — 99223 1ST HOSP IP/OBS HIGH 75: CPT | Mod: GC

## 2025-06-10 PROCEDURE — 99233 SBSQ HOSP IP/OBS HIGH 50: CPT

## 2025-06-10 PROCEDURE — 99497 ADVNCD CARE PLAN 30 MIN: CPT

## 2025-06-10 RX ORDER — HYDROMORPHONE/SOD CHLOR,ISO/PF 2 MG/10 ML
0.2 SYRINGE (ML) INJECTION ONCE
Refills: 0 | Status: DISCONTINUED | OUTPATIENT
Start: 2025-06-10 | End: 2025-06-10

## 2025-06-10 RX ADMIN — Medication 40 MILLIGRAM(S): at 17:44

## 2025-06-10 RX ADMIN — CLOPIDOGREL BISULFATE 75 MILLIGRAM(S): 75 TABLET, FILM COATED ORAL at 11:26

## 2025-06-10 RX ADMIN — LIDOCAINE HYDROCHLORIDE 1 PATCH: 20 JELLY TOPICAL at 14:59

## 2025-06-10 RX ADMIN — METOPROLOL SUCCINATE 50 MILLIGRAM(S): 50 TABLET, EXTENDED RELEASE ORAL at 06:06

## 2025-06-10 RX ADMIN — Medication 0.2 MILLIGRAM(S): at 02:44

## 2025-06-10 RX ADMIN — ATORVASTATIN CALCIUM 40 MILLIGRAM(S): 80 TABLET, FILM COATED ORAL at 21:44

## 2025-06-10 RX ADMIN — Medication 650 MILLIGRAM(S): at 00:19

## 2025-06-10 RX ADMIN — LIDOCAINE HYDROCHLORIDE 1 PATCH: 20 JELLY TOPICAL at 05:57

## 2025-06-10 RX ADMIN — Medication 40 MILLIGRAM(S): at 06:06

## 2025-06-10 RX ADMIN — SERTRALINE 100 MILLIGRAM(S): 100 TABLET, FILM COATED ORAL at 11:26

## 2025-06-10 RX ADMIN — Medication 81 MILLIGRAM(S): at 11:26

## 2025-06-10 RX ADMIN — Medication 0.2 MILLIGRAM(S): at 03:20

## 2025-06-10 NOTE — PROGRESS NOTE ADULT - CONVERSATION DETAILS
Discussed pt's lab/imaging results thus far and recommendations from consultants (see above). Discussed patient's Code Status- pt states she previously filled out forms and confirms her Code Status at this time is Full Code. She clarifies that she would not want to rely on tubes to live or remain on life support

## 2025-06-10 NOTE — PROGRESS NOTE ADULT - PROBLEM SELECTOR PLAN 3
- DAVID on admission (pre-renal, BUN 33/Cr 1.4, eGFR 39)    PLAN  - Urine electrolytes, UA  - US renal no hydropnephrosis  - Hold home losartan, hydrochlorothiazide  - Renally dose drugs, avoid nephrotoxic drugs Baseline Cr 0.7-0.9  Cr 1.4 on admission  Renal US no hydropnephrosis, nonobstructing calculus  f/u urine studies  Renally dose drugs, avoid nephrotoxic drugs

## 2025-06-10 NOTE — PATIENT PROFILE ADULT - FALL HARM RISK - HARM RISK INTERVENTIONS

## 2025-06-10 NOTE — PHYSICAL THERAPY INITIAL EVALUATION ADULT - GENERAL OBSERVATIONS, REHAB EVAL
Pt encountered in seated at edge of bed in NAD, all lines intact, a&ox4, SPO2 100%, and JANETH Hein aware.

## 2025-06-10 NOTE — PROGRESS NOTE ADULT - PROBLEM SELECTOR PLAN 5
- Known history of HTN   - On Hydrochlorothiazide, losartan, metoprolol  - On admission to ED: 106/46    PLAN:  - Hold Hydrochlorothiazide, losartan in setting of DAVID - Home meds: Hydrochlorothiazide, losartan, metoprolol  - Hold Hydrochlorothiazide and losartan iso DAVID  - Continue Toprol 50mg qd

## 2025-06-10 NOTE — CONSULT NOTE ADULT - ATTENDING COMMENTS
75F, PAD s/p stent on DAPT, recent per kris, rest of hx as above, admitted w chest pain, DE LA TORRE, dark stools.   +episodic nsaid use   Declined rectal exam   Hgb 8.2 today after 1Uprb     Recommendations   - Initially declined EGD but now states she will think about it  - NPO at [facility] for possible EGD if she is amenable   - cards c/s for clearance given chest pain and SOB   - IV PPI BID   - trend h/h  - monitor bowel movements  - rest of care per primary team   GI to follow, please call w questions 75F, PAD s/p stent on DAPT, recent per kris, rest of hx as above, admitted w chest pain, DE [place] TORRE, dark stools.   +episodic nsaid use   Declined rectal exam   Hgb 8.2 today after 1Uprb     Recommendations   - Initially declined EGD but now states she will think about it. Long discussion at bedside. R/B discussed in detail w patient.   - NPO at [facility] for possible EGD if she is amenable   - cards c/s for clearance given chest pain and SOB   - IV PPI BID   - trend h/h  - monitor bowel movements  - rest of care per primary team   GI to follow, please call w questions

## 2025-06-10 NOTE — PHYSICAL THERAPY INITIAL EVALUATION ADULT - PERTINENT HX OF CURRENT PROBLEM, REHAB EVAL
Patient is a 75 year old Female, PMH stated below, presents with weeks of substernal chest pain that radiates to back and dyspnea on exertion, suspicion for stable angina and symptomatic anemia. CXR and CTA unremarkable.

## 2025-06-10 NOTE — PROGRESS NOTE ADULT - PROBLEM SELECTOR PLAN 4
- Known history of PAD s/p two stents  - On aspirin 81 mg, atorvastatin 40mg qd, plavix 75 mg qd  - Weak peripheral pulses    PLAN:  - Continue aspirin, atorvastatin, plavix  - Lipid panel  - LDL goal < 70 - s/p two stents  - Continue aspirin 81mg, atorvastatin 40mg qd, plavix 75mg qd

## 2025-06-10 NOTE — PROGRESS NOTE ADULT - SUBJECTIVE AND OBJECTIVE BOX
PROGRESS NOTE:   Authored by Serenity Davalos Ma, MD  Available on MS Teams; Pager [phone]    Patient is a 75y old  Female who presents with a chief complaint of DE LA TORRE (10 Chris 2025 13:08)    SUBJECTIVE / OVERNIGHT EVENTS: Pt seen and examined with son at bedside this AM. Pt reports significant anxiety re: ativan. She was previously scheduled for an EGD/colo and received ativan prior to the procedure, however developed severe hallucinations- thought she was in a hotel, held against her will, became agitated/fearful and began kicking at staff, then required restraints. Her procedure had to be cancelled. Delirium continued when she was discharged home, was waving at the TV and talking to family that wasn't there. I asked if it is possible her GB infection played a role or she had hospital acquired delirium, pt/son states it's possible. Pt adamant that she wants to avoid any benzodiazepine. She states she is amenable to having an EGD/colo done while here, will sign consent forms for it, but would prefer not to be told what day the procedure will be (per pt and son- pt will become very anxious, think about it for days, and spiral). Otherwise- pt reports some improved CP and DE LA TORRE s/p 1U pRBCs    All other review of systems is negative unless indicated above.    MEDICATIONS  (STANDING):  aspirin enteric coated 81 milliGRAM(s) Oral daily  atorvastatin 40 milliGRAM(s) Oral at bedtime  clopidogrel Tablet 75 milliGRAM(s) Oral daily  dextrose 5%. 1000 milliLiter(s) (100 mL/Hr) IV Continuous <Continuous>  dextrose 5%. 1000 milliLiter(s) (50 mL/Hr) IV Continuous <Continuous>  dextrose 50% Injectable 25 Gram(s) IV Push once  dextrose 50% Injectable 12.5 Gram(s) IV Push once  dextrose 50% Injectable 25 Gram(s) IV Push once  glucagon  Injectable 1 milliGRAM(s) IntraMuscular once  insulin lispro (ADMELOG) corrective regimen sliding scale   SubCutaneous three times a day before meals  insulin lispro (ADMELOG) corrective regimen sliding scale   SubCutaneous at bedtime  metoprolol succinate ER 50 milliGRAM(s) Oral daily  pantoprazole  Injectable 40 milliGRAM(s) IV Push two times a day  sertraline 100 milliGRAM(s) Oral daily    MEDICATIONS  (PRN):  acetaminophen     Tablet .. 650 milliGRAM(s) Oral every 6 hours PRN Temp greater or equal to 38C (100.4F), Mild Pain (1 - 3)  dextrose Oral Gel 15 Gram(s) Oral once PRN Blood Glucose LESS THAN 70 milliGRAM(s)/deciliter  melatonin 3 milliGRAM(s) Oral at bedtime PRN Insomnia      CAPILLARY BLOOD GLUCOSE      POCT Blood Glucose.: 124 mg/dL (10 Chris 2025 08:32)  POCT Blood Glucose.: 172 mg/dL (10 Chris 2025 02:20)  POCT Blood Glucose.: 191 mg/dL (09 Jun 2025 22:01)  POCT Blood Glucose.: 133 mg/dL (09 Jun 2025 20:36)  POCT Blood Glucose.: 80 mg/dL (09 Jun 2025 19:45)  POCT Blood Glucose.: 57 mg/dL (09 Jun 2025 19:27)    I&O's Summary      PHYSICAL EXAM:  Vital Signs Last 24 Hrs  T(C): 37.1 (10 Chris 2025 13:53), Max: 37.1 (10 Chris 2025 02:16)  T(F): 98.7 (10 Chris 2025 13:53), Max: 98.7 (10 Chris 2025 02:16)  HR: 70 (10 Chris 2025 13:53) (63 - 77)  BP: 140/60 (10 Chris 2025 13:53) (122/62 - 154/50)  BP(mean): --  RR: 16 (10 Chris 2025 13:53) (14 - 20)  SpO2: 100% (10 Chris 2025 13:53) (97% - 100%)    Parameters below as of 10 Chris 2025 13:53  Patient On (Oxygen Delivery Method): room air    GENERAL: No acute distress  HEAD:  Normocephalic  EYES: Conjunctiva and sclera clear  NECK: Supple  CHEST/LUNG: CTAB  HEART: Regular rate and rhythm  ABDOMEN: Soft, non-tender, non-distended. Drain site C/D/I  EXTREMITIES: No clubbing, cyanosis, or edema  NEUROLOGY: A&O x 3  SKIN: No rashes or lesions to visible skin    LABS:                        8.2    11.20 )-----------( 347      ( 10 Chris 2025 06:22 )             25.6     06-10    139  |  102  |  36[H]  ----------------------------<  103[H]  4.1   |  23  |  1.41[H]    Ca    9.5      10 Chris 2025 06:22  Phos  4.5     06-10  Mg     1.90     06-10    TPro  6.5  /  Alb  3.5  /  TBili  0.6  /  DBili  x   /  AST  13  /  ALT  6   /  AlkPhos  111  06-10          Urinalysis Basic - ( 10 Chris 2025 06:22 )    Color: x / Appearance: x / SG: x / pH: x  Gluc: 103 mg/dL / Ketone: x  / Bili: x / Urobili: x   Blood: x / Protein: x / Nitrite: x   Leuk Esterase: x / RBC: x / WBC x   Sq Epi: x / Non Sq Epi: x / Bacteria: x            RADIOLOGY & ADDITIONAL TESTS: Reviewed

## 2025-06-10 NOTE — PROGRESS NOTE ADULT - PROBLEM SELECTOR PLAN 6
- History of HLD + PAD s/p stents in bilateral lower extremities  - On aspirin 81 mg, atorvastatin 40mg qd    PLAN:  - Lipid panel   - Continue atorvastatin - Continue atorvastatin 40mg qhs

## 2025-06-10 NOTE — PHYSICAL THERAPY INITIAL EVALUATION ADULT - ACTIVE RANGE OF MOTION EXAMINATION, REHAB EVAL
yee. upper extremity Active ROM was WNL (within normal limits)/bilateral lower extremity Active ROM was WNL (within normal limits)

## 2025-06-10 NOTE — PHYSICAL THERAPY INITIAL EVALUATION ADULT - LEVEL OF INDEPENDENCE: SIT/STAND, REHAB EVAL
Mammogram ordered  Central Scheduling      shingrix shingles vaccine if you want, check with your insurance     Fasting labs are ordered  
supervision

## 2025-06-10 NOTE — PHYSICAL THERAPY INITIAL EVALUATION ADULT - ADDITIONAL COMMENTS
Pt left semisupine in bed in NAD, all lines intact, call bell in reach, SPO2 100%, bed rails raised/bed at lowest level, and JANETH Hein aware.

## 2025-06-10 NOTE — PROGRESS NOTE ADULT - PROBLEM SELECTOR PLAN 2
- Normocytic anemia on admission to ED: Hgb 7,1, MCV 80.9.   - Recent episode of black stool? though limited historian regarding this as told other providers no black stool  - No recent colonoscopy or endoscopy    PLAN  - Consent for transfusion, give 1u  packed RBCs   - Goal of Hgb 8 or above  - Iron studies  - GI consult Hb 7.1 on admission with c/o "dark stool" recently, c/f GIB  Per pt- last colonosopy was 6 years ago which showed polyps  Recent EGD/colo was cancelled d/t delirium and agitation (pt attributed to ativan)  GI consulted  Continue IV PPI BID  s/p 1U pRBCs on admission with appropriate response  Iron studies c/w severe iron deficiency (iron sat 6%, ferritin 22)    NPO at [facility] for possible EGD tomorrow  Cardiology clearance

## 2025-06-10 NOTE — CONSULT NOTE ADULT - ASSESSMENT
75F with T2DM, PAD s/p bilateral leg stenting (on DAPT, last yesterday), recent perc kris on 5/12/25 presenting with 2 week history of chest pain and dyspnea on exertion with reports of dark stools    Impression  #dark stools  #anemia  Patient with PAD on DAPT (last 6/10) presenting with chest pain, DE LA TORRE, and dark stools. Reports dark stools x2 episodes; first episode last week followed by brown stools; recurrent episode of black, loose stools yesterday and this AM. No significant NSAID use; No prior EGD; colonoscopy 6 years ago with polyps. Hemodynamically stable. Labs with Hgb 7.1 s/p 1U -> 8.2 (baseline ~8); BUN/Cr 36/1.46. CTA Chest negative for PE. On exam, pt comfortable; refused rectal exam. Discussed role for endoscopy for further evaluation if patient with true melena; however, pt does not want to proceed at this time and is understanding of the risks of delaying endoscopic evaluation.    Recommendations  - patient refusing endoscopic evaluation at this time  - IV PPI BID  - cardiology evaluation/optimization  - monitor stools  - trend H/H; transfuse as needed    Note and recommendations are incomplete until reviewed and attested by attending.    Ирина Gayle MD  GI/Hepatology Fellow, PGY5  Long Range Pager [phone] or [facility] Pager [phone]  Teams preferred (7AM to 5PM); after 5PM, call GI fellow on call    On Weekends/Holidays (All Day) and Weekdays after 5PM to 8AM  For non-urgent consults, please email [email or web address] and [email or web address]  For urgent consults, please contact on call GI team. See Amion schedule ([facility]), "[employer] paging system ([facility]), or call hospital  ([facility]) 75F with T2DM, PAD s/p bilateral leg stenting (on DAPT, last yesterday), recent perc kris on 5/12/25 presenting with 2 week history of chest pain and dyspnea on exertion with reports of dark stools    Impression  #dark stools  #anemia  Patient with PAD on DAPT (last 6/10) presenting with chest pain, DE LA TORRE, and dark stools. Reports dark stools x2 episodes; first episode last week followed by brown stools; recurrent episode of black, loose stools yesterday and this AM. No significant NSAID use; No prior EGD; colonoscopy 6 years ago with polyps. Hemodynamically stable. Labs with Hgb 7.1 s/p 1U -> 8.2 (baseline ~8); BUN/Cr 36/1.46. CTA Chest negative for PE. On exam, pt comfortable; refused rectal exam. Discussed role for endoscopy for further evaluation if patient with true melena and patient will consider EGD but if she does not proceed, she is understanding of the risks of delaying endoscopic evaluation.    Recommendations  - will tentatively plan for EGD tomorrow (6/11) pending cardiac risk stratification and patient remains amenable  - IV PPI BID  - cardiology evaluation/optimization  - monitor stools  - trend H/H; transfuse as needed    Note and recommendations are incomplete until reviewed and attested by attending.    Ирина Gayle MD  GI/Hepatology Fellow, PGY5  Long Range Pager [phone] or [facility] Pager [phone]  Teams preferred (7AM to 5PM); after 5PM, call GI fellow on call    On Weekends/Holidays (All Day) and Weekdays after 5PM to 8AM  For non-urgent consults, please email [email or web address] and [email or web address]  For urgent consults, please contact on call GI team. See Amion schedule ([facility]), [employer] paging system ([facility]), or call hospital  ([facility])

## 2025-06-10 NOTE — PROGRESS NOTE ADULT - PROBLEM SELECTOR PLAN 1
Hx HTN, HLD, PAD s/p stents, T2DM, Negative cardiac stress test 1.5 years ago, TTE 5.12.25: grade 1 diastolic dysfunction, EF 76% hyperdynamic   DE LA TORRE and CP with exertion (not with rest) could be related to symptomatic anemia vs CAD. Euvolemic on exam and CT without edema  - EKG in ED: NSR, mild ST depressions  - pro-BNP 1,834, trop 14, repeat 15  - CXR, CTA in ED 6.9.25 unremarkable  - Hgb 7.1 in ED on admission    PLAN:  - Monitor on Tele  - transfuse 1u PRBC and if sx not improved with this, would consult cardiology in AM for ischemic eval and possible repeat echo.  - Recent TTE 5/12/25 and unremarkable. Hold off on repeating for now  - Anemia treatment as below  - DAVID treatment as below CP and DE LA TORRE likely 2/2 symptomatic anemia  Hb 7.1 on admission, no recent baseline. Iron studies c/w KARLI  Last TTE 5/2025: EF 76%, G1DD  CXR clear, CTA neg for PE, trops neg  s/p 1U pRBCs with some improvement    Anemia management as below

## 2025-06-10 NOTE — PROGRESS NOTE ADULT - PROBLEM SELECTOR PLAN 7
- Home regimen: metformin, glipizide  - A1c 5.5, previously 6.4- likely iso progressive anemia  - Insulin sliding scale + fingersticks TIDAC  - Hgb A1C - Home regimen: metformin, glipizide  - A1c 5.5, previously 6.4- likely iso progressive anemia  - Continue LDISS

## 2025-06-11 LAB
ANION GAP SERPL CALC-SCNC: 15 MMOL/L — HIGH (ref 7–14)
APPEARANCE UR: CLEAR — SIGNIFICANT CHANGE UP
BACTERIA # UR AUTO: ABNORMAL /HPF
BILIRUB UR-MCNC: NEGATIVE — SIGNIFICANT CHANGE UP
BUN SERPL-MCNC: 26 MG/DL — HIGH (ref 7–23)
CALCIUM SERPL-MCNC: 9.3 MG/DL — SIGNIFICANT CHANGE UP (ref 8.4–10.5)
CHLORIDE SERPL-SCNC: 102 MMOL/L — SIGNIFICANT CHANGE UP (ref 98–107)
CO2 SERPL-SCNC: 24 MMOL/L — SIGNIFICANT CHANGE UP (ref 22–31)
COLOR SPEC: YELLOW — SIGNIFICANT CHANGE UP
CREAT ?TM UR-MCNC: 91 MG/DL — SIGNIFICANT CHANGE UP
CREAT SERPL-MCNC: 0.96 MG/DL — SIGNIFICANT CHANGE UP (ref 0.5–1.3)
DIFF PNL FLD: NEGATIVE — SIGNIFICANT CHANGE UP
EGFR: 61 ML/MIN/1.73M2 — SIGNIFICANT CHANGE UP
EGFR: 61 ML/MIN/1.73M2 — SIGNIFICANT CHANGE UP
FINE GRAN CASTS #/AREA URNS AUTO: PRESENT
GLUCOSE BLDC GLUCOMTR-MCNC: 140 MG/DL — HIGH (ref 70–99)
GLUCOSE BLDC GLUCOMTR-MCNC: 169 MG/DL — HIGH (ref 70–99)
GLUCOSE BLDC GLUCOMTR-MCNC: 176 MG/DL — HIGH (ref 70–99)
GLUCOSE BLDC GLUCOMTR-MCNC: 180 MG/DL — HIGH (ref 70–99)
GLUCOSE SERPL-MCNC: 150 MG/DL — HIGH (ref 70–99)
GLUCOSE UR QL: NEGATIVE MG/DL — SIGNIFICANT CHANGE UP
HCT VFR BLD CALC: 26.2 % — LOW (ref 34.5–45)
HGB BLD-MCNC: 8.3 G/DL — LOW (ref 11.5–15.5)
KETONES UR QL: NEGATIVE MG/DL — SIGNIFICANT CHANGE UP
LEUKOCYTE ESTERASE UR-ACNC: ABNORMAL
MAGNESIUM SERPL-MCNC: 1.6 MG/DL — SIGNIFICANT CHANGE UP (ref 1.6–2.6)
MCHC RBC-ENTMCNC: 25.3 PG — LOW (ref 27–34)
MCHC RBC-ENTMCNC: 31.7 G/DL — LOW (ref 32–36)
MCV RBC AUTO: 79.9 FL — LOW (ref 80–100)
MRSA PCR RESULT.: SIGNIFICANT CHANGE UP
NITRITE UR-MCNC: NEGATIVE — SIGNIFICANT CHANGE UP
NRBC # BLD AUTO: 0 K/UL — SIGNIFICANT CHANGE UP (ref 0–0)
NRBC # FLD: 0 K/UL — SIGNIFICANT CHANGE UP (ref 0–0)
NRBC BLD AUTO-RTO: 0 /100 WBCS — SIGNIFICANT CHANGE UP (ref 0–0)
PH UR: 5.5 — SIGNIFICANT CHANGE UP (ref 5–8)
PHOSPHATE SERPL-MCNC: 4.4 MG/DL — SIGNIFICANT CHANGE UP (ref 2.5–4.5)
PLATELET # BLD AUTO: 372 K/UL — SIGNIFICANT CHANGE UP (ref 150–400)
POTASSIUM SERPL-MCNC: 3.6 MMOL/L — SIGNIFICANT CHANGE UP (ref 3.5–5.3)
POTASSIUM SERPL-SCNC: 3.6 MMOL/L — SIGNIFICANT CHANGE UP (ref 3.5–5.3)
PROT UR-MCNC: NEGATIVE MG/DL — SIGNIFICANT CHANGE UP
RBC # BLD: 3.28 M/UL — LOW (ref 3.8–5.2)
RBC # FLD: 17.4 % — HIGH (ref 10.3–14.5)
RBC CASTS # UR COMP ASSIST: 2 /HPF — SIGNIFICANT CHANGE UP (ref 0–4)
REVIEW: SIGNIFICANT CHANGE UP
S AUREUS DNA NOSE QL NAA+PROBE: SIGNIFICANT CHANGE UP
SODIUM SERPL-SCNC: 141 MMOL/L — SIGNIFICANT CHANGE UP (ref 135–145)
SODIUM UR-SCNC: 82 MMOL/L — SIGNIFICANT CHANGE UP
SP GR SPEC: 1.02 — SIGNIFICANT CHANGE UP (ref 1–1.03)
SQUAMOUS # UR AUTO: 7 /HPF — HIGH (ref 0–5)
UROBILINOGEN FLD QL: 0.2 MG/DL — SIGNIFICANT CHANGE UP (ref 0.2–1)
WBC # BLD: 10.17 K/UL — SIGNIFICANT CHANGE UP (ref 3.8–10.5)
WBC # FLD AUTO: 10.17 K/UL — SIGNIFICANT CHANGE UP (ref 3.8–10.5)
WBC UR QL: 2 /HPF — SIGNIFICANT CHANGE UP (ref 0–5)

## 2025-06-11 PROCEDURE — 43255 EGD CONTROL BLEEDING ANY: CPT | Mod: GC

## 2025-06-11 PROCEDURE — 99232 SBSQ HOSP IP/OBS MODERATE 35: CPT

## 2025-06-11 DEVICE — CLIP RESOLUTION 360 235CM: Type: IMPLANTABLE DEVICE | Status: FUNCTIONAL

## 2025-06-11 RX ORDER — INSULIN LISPRO 100 U/ML
INJECTION, SOLUTION INTRAVENOUS; SUBCUTANEOUS EVERY 6 HOURS
Refills: 0 | Status: DISCONTINUED | OUTPATIENT
Start: 2025-06-11 | End: 2025-06-12

## 2025-06-11 RX ADMIN — METOPROLOL SUCCINATE 50 MILLIGRAM(S): 50 TABLET, EXTENDED RELEASE ORAL at 05:13

## 2025-06-11 RX ADMIN — Medication 81 MILLIGRAM(S): at 11:20

## 2025-06-11 RX ADMIN — Medication 3 MILLIGRAM(S): at 21:37

## 2025-06-11 RX ADMIN — INSULIN LISPRO 1: 100 INJECTION, SOLUTION INTRAVENOUS; SUBCUTANEOUS at 07:07

## 2025-06-11 RX ADMIN — Medication 40 MILLIGRAM(S): at 05:13

## 2025-06-11 RX ADMIN — SERTRALINE 100 MILLIGRAM(S): 100 TABLET, FILM COATED ORAL at 11:21

## 2025-06-11 RX ADMIN — INSULIN LISPRO 1: 100 INJECTION, SOLUTION INTRAVENOUS; SUBCUTANEOUS at 21:40

## 2025-06-11 RX ADMIN — CLOPIDOGREL BISULFATE 75 MILLIGRAM(S): 75 TABLET, FILM COATED ORAL at 11:21

## 2025-06-11 RX ADMIN — ATORVASTATIN CALCIUM 40 MILLIGRAM(S): 80 TABLET, FILM COATED ORAL at 21:37

## 2025-06-11 NOTE — CONSULT NOTE ADULT - SUBJECTIVE AND OBJECTIVE BOX
Chief Complaint:  Patient is a 75y old  Female who presents with a chief complaint of DE LA TORRE (09 Jun 2025 19:23)    HPI:  75F with T2DM, PAD s/p bilateral leg stenting (on DAPT, last yesterday), recent perc kris on 5.12.25 presenting with 2 week history of chest pain and dyspnea on exertion. For the past two weeks, pt with chest pain that radiates to her back, also associated with shortness of breath. No chest pain or SOB at rest. Has had prior work-up for angina and notes a negative stress test 1.5 years ago. She has been very lethargic and notes that she lays in bed all day, even prior to her perc kris. During this period, her appetite has also been worse, causing her to not eat or drink as much as normal. For the past couple of weeks, she's also felt dizzy upon standing. Reports intermittent black stools, once last week and another episode yesterday and this AM. No abd pain, nausea, vomiting. Takes Advil two pills once weekly. No prior EGD; colonoscopy 6 years ago with polyps.    Allergies:  No Known Allergies      Home Medications:    Hospital Medications:  acetaminophen     Tablet .. 650 milliGRAM(s) Oral every 6 hours PRN  aspirin enteric coated 81 milliGRAM(s) Oral daily  atorvastatin 40 milliGRAM(s) Oral at bedtime  clopidogrel Tablet 75 milliGRAM(s) Oral daily  dextrose 5%. 1000 milliLiter(s) IV Continuous <Continuous>  dextrose 5%. 1000 milliLiter(s) IV Continuous <Continuous>  dextrose 50% Injectable 25 Gram(s) IV Push once  dextrose 50% Injectable 12.5 Gram(s) IV Push once  dextrose 50% Injectable 25 Gram(s) IV Push once  dextrose Oral Gel 15 Gram(s) Oral once PRN  glucagon  Injectable 1 milliGRAM(s) IntraMuscular once  insulin lispro (ADMELOG) corrective regimen sliding scale   SubCutaneous three times a day before meals  insulin lispro (ADMELOG) corrective regimen sliding scale   SubCutaneous at bedtime  melatonin 3 milliGRAM(s) Oral at bedtime PRN  metoprolol succinate ER 50 milliGRAM(s) Oral daily  pantoprazole  Injectable 40 milliGRAM(s) IV Push two times a day  sertraline 100 milliGRAM(s) Oral daily      PMHX/PSHX:  HTN (hypertension)    Diabetes mellitus    Depression    Spinal stenosis    Herniated lumbar intervertebral disc    Hyperlipidemia    Paralytic ileus    Renal calculi    H/O arthroscopy of left knee    H/O bilateral cataract extraction    Previous back surgery    H/O cystoscopy    Family history:  No pertinent family history in first degree relatives    ROS:     General:  No wt loss, fevers, chills  ENT:  No dysphagia  CV:  No pain, palpitations  Pulm:  No dyspnea, cough  GI:  No pain, No nausea, No vomiting, No diarrhea, No constipation, No rectal bleeding, +black stools, No dysphagia  Muscle:  No pain, weakness  Neuro:  No weakness  Heme:  No ecchymosis  Skin:  No rash    PHYSICAL EXAM:  GENERAL:  No acute distress  HEENT:  no scleral icterus  CHEST:  no accessory muscle use  HEART:  Regular rate and rhythm  ABDOMEN:  Soft, non-tender, non-distended  EXTREMITIES: No edema  SKIN:  No rash/ecchymoses  NEURO:  Alert and oriented x 3    Vital Signs:  Vital Signs Last 24 Hrs  T(C): 36.8 (10 Chris 2025 10:22), Max: 37.1 (10 Chris 2025 02:16)  T(F): 98.3 (10 Chris 2025 10:22), Max: 98.7 (10 Chris 2025 02:16)  HR: 77 (10 Chris 2025 10:22) (63 - 80)  BP: 122/62 (10 Chris 2025 10:22) (99/68 - 154/50)  BP(mean): --  RR: 17 (10 Chris 2025 10:22) (14 - 20)  SpO2: 100% (10 Chris 2025 10:22) (97% - 100%)    Parameters below as of 10 Chris 2025 10:22  Patient On (Oxygen Delivery Method): room air      Daily     Daily     LABS:                        8.2    11.20 )-----------( 347      ( 10 Chris 2025 06:22 )             25.6     Mean Cell Volume: 78.3 fL (06-10-25 @ 06:22)    06-10    139  |  102  |  36[H]  ----------------------------<  103[H]  4.1   |  23  |  1.41[H]    Ca    9.5      10 Chris 2025 06:22  Phos  4.5     06-10  Mg     1.90     06-10    TPro  6.5  /  Alb  3.5  /  TBili  0.6  /  DBili  x   /  AST  13  /  ALT  6   /  AlkPhos  111  06-10    LIVER FUNCTIONS - ( 10 Chris 2025 06:22 )  Alb: 3.5 g/dL / Pro: 6.5 g/dL / ALK PHOS: 111 U/L / ALT: 6 U/L / AST: 13 U/L / GGT: x             Urinalysis Basic - ( 10 Chris 2025 06:22 )    Color: x / Appearance: x / SG: x / pH: x  Gluc: 103 mg/dL / Ketone: x  / Bili: x / Urobili: x   Blood: x / Protein: x / Nitrite: x   Leuk Esterase: x / RBC: x / WBC x   Sq Epi: x / Non Sq Epi: x / Bacteria: x                              8.2    11.20 )-----------( 347      ( 10 Chris 2025 06:22 )             25.6                         7.1    12.18 )-----------( 391      ( 09 Jun 2025 14:50 )             23.2    
Cardiovascular Disease Initial Evaluation  DATE OF SERVICE: 06-11-25 @ 08:16    CHIEF COMPLAINT: Weakness    HISTORY OF PRESENT ILLNESS:    This is a 75 year old woman with HTN, T2DM, PAD s/p bilateral leg stenting, and recent perc kris on 5.12.25  who presented to [facility] on 6/[facility]20[facility] with chest pain and dyspnea on exertion. For the past several weeks she has had exertional chest pain that radiates to her back, also associated with shortness of breath. No chest pain or SOB at rest. Has had prior work-up for angina and notes a negative stress test 1.5 years ago.  She denies fevers, chills, abd pain, n/v/d/c, dysuria, leg swelling. She has had no sick contacts. Today she was sent in by PCP due to her symptoms and soft BP.      Allergies  No Known Allergies        MEDICATIONS:  aspirin enteric coated 81 milliGRAM(s) Oral daily  clopidogrel Tablet 75 milliGRAM(s) Oral daily  metoprolol succinate ER 50 milliGRAM(s) Oral daily        acetaminophen     Tablet .. 650 milliGRAM(s) Oral every 6 hours PRN  melatonin 3 milliGRAM(s) Oral at bedtime PRN  sertraline 100 milliGRAM(s) Oral daily    pantoprazole  Injectable 40 milliGRAM(s) IV Push two times a day    atorvastatin 40 milliGRAM(s) Oral at bedtime  dextrose 50% Injectable 25 Gram(s) IV Push once  dextrose 50% Injectable 12.5 Gram(s) IV Push once  dextrose 50% Injectable 25 Gram(s) IV Push once  dextrose Oral Gel 15 Gram(s) Oral once PRN  glucagon  Injectable 1 milliGRAM(s) IntraMuscular once  insulin lispro (ADMELOG) corrective regimen sliding scale   SubCutaneous every 6 hours    dextrose 5%. 1000 milliLiter(s) IV Continuous <Continuous>  dextrose 5%. 1000 milliLiter(s) IV Continuous <Continuous>      PAST MEDICAL & SURGICAL HISTORY:  HTN (hypertension)      Diabetes mellitus  Type 2 (on Metformin and Glimepiride)      Depression      Spinal stenosis  s/p "back surgery"      Herniated lumbar intervertebral disc      Hyperlipidemia      Paralytic ileus  3-4 years ago, spontaneous - hospitalized with NGT for decompression; resolved on it owns own      Renal calculi      H/O arthroscopy of left knee  ~15 years ago; Repair of the left meniscus      H/O bilateral cataract extraction  ~5 years ago      Previous back surgery  as per patient, "they shaved a bone in my back." ~4-5 years ago with Dr. Gracia      H/O cystoscopy  Pt reports hx of cystoscopy with stent placement "years ago"          FAMILY HISTORY:  HTN    SOCIAL HISTORY:    The patient is a nonsmoker       REVIEW OF SYSTEMS:  See HPI, otherwise complete 14 point review of systems negative    PHYSICAL EXAM:  T(C): 36.9 (06-11-25 @ 05:10), Max: 37.1 (06-10-25 @ 13:53)  HR: 72 (06-11-25 @ 05:10) (70 - 77)  BP: 130/50 (06-11-25 @ 05:10) (122/62 - 140/60)  RR: 18 (06-11-25 @ 05:10) (16 - 18)  SpO2: 100% (06-11-25 @ 05:10) (74% - 100%)  Wt(kg): --  I&O's Summary    10 Chris 2025 07:01  -  11 Jun 2025 07:00  --------------------------------------------------------  IN: 150 mL / OUT: 600 mL / NET: -450 mL        Appearance: No Acute Distress; resting comfortably  HEENT:  Normal oral mucosa, PERRL, EOMI	  Cardiovascular: Normal S1 S2, No JVD, No murmurs/rubs/gallops  Respiratory: Normal respiratory effort; Lungs clear to auscultation bilaterally  Gastrointestinal:  Soft, Non-tender, + BS	  Skin: No rashes, No ecchymoses, No cyanosis	  Neurologic: Non-focal; no weakness  Extremities: No clubbing, cyanosis or edema  Psychiatry: A & O x 3, Mood & affect appropriate    Laboratory Data:	 	    CBC Full  -  ( 11 Jun 2025 05:58 )  WBC Count : 10.17 K/uL  Hemoglobin : 8.3 g/dL  Hematocrit : 26.2 %  Platelet Count - Automated : 372 K/uL  Mean Cell Volume : 79.9 fL  Mean Cell Hemoglobin : 25.3 pg  Mean Cell Hemoglobin Concentration : 31.7 g/dL  Auto Neutrophil # : x  Auto Lymphocyte # : x  Auto Monocyte # : x  Auto Eosinophil # : x  Auto Basophil # : x  Auto Neutrophil % : x  Auto Lymphocyte % : x  Auto Monocyte % : x  Auto Eosinophil % : x  Auto Basophil % : x    06-11    141  |  102  |  26[H]  ----------------------------<  150[H]  3.6   |  24  |  0.96  06-10    139  |  102  |  36[H]  ----------------------------<  103[H]  4.1   |  23  |  1.41[H]    Ca    9.3      11 Jun 2025 05:58  Ca    9.5      10 Chris 2025 06:22  Phos  4.4     06-11  Phos  4.5     06-10  Mg     1.60     06-11  Mg     1.90     06-10    TPro  6.5  /  Alb  3.5  /  TBili  0.6  /  DBili  x   /  AST  13  /  ALT  6   /  AlkPhos  111  06-10  TPro  7.1  /  Alb  3.6  /  TBili  0.2  /  DBili  x   /  AST  12  /  ALT  7   /  AlkPhos  118  06-09            Interpretation of Telemetry: Sinus; no ectopy	    ECG:  	Sinus rhythm; nonspecific ST changes    Assessment: 75 year old woman with HTN, T2DM, PAD s/p bilateral leg stenting, and recent perc kris on 5.12.25 presents with anemia.     Plan of Care:    #Cardiac risk evaluation-  Ms. Haywood displays no signs of coronary ischemia or CHF.   EKG is sinus without ischemic changes.  Recent echo noted- preserved LVEF with no significant valvular disease.   Her SOB on exertion is likely secondary to anemia.   The patient is optimized from a cardiac standpoint to undergo EGD.     #Peripheral vascular disease-  S/p bilateral lower extremity stenting in [place] about 1 month ago.  I will defer DAPT management to her vascular team.     #HTN-  BP acceptable.       46 minutes spent on total encounter; 100% of the visit was spent counseling and/or coordinating care by the attending physician.   	  Bob Tan MD [facility]  Cardiovascular Diseases  [phone]

## 2025-06-11 NOTE — PROGRESS NOTE ADULT - PROBLEM SELECTOR PLAN 7
- Home regimen: metformin, glipizide  - A1c 5.5, previously 6.4- likely iso progressive anemia  - Continue LDISS

## 2025-06-11 NOTE — PROGRESS NOTE ADULT - PROBLEM SELECTOR PLAN 10
DVT Prophylaxis: Held iso anemia  Diet: NPO pending EGD  Dispo: Home pending GI workup. PT- no skilled needs
DVT Prophylaxis: Held iso anemia  Diet: DASH/TLC, CC. NPO after MN pending possible endoscopy tomorrow  Dispo: Home pending GI workup. PT- no skilled needs

## 2025-06-11 NOTE — PROGRESS NOTE ADULT - PROBLEM SELECTOR PLAN 5
- Home meds: Hydrochlorothiazide, losartan, metoprolol  - Held Hydrochlorothiazide and losartan iso DAVID- resume as able  - Continue Toprol 50mg qd

## 2025-06-11 NOTE — PROGRESS NOTE ADULT - PROBLEM SELECTOR PLAN 1
CP and DE LA TORRE likely 2/2 symptomatic anemia  Hb 7.1 on admission, no recent baseline. Iron studies c/w KARLI  Last TTE 5/2025: EF 76%, G1DD  CXR clear, CTA neg for PE, trops neg  s/p 1U pRBCs with some improvement    Anemia management as below

## 2025-06-11 NOTE — PROGRESS NOTE ADULT - TIME BILLING
Patient encounter, including chart review, medication review, patient interview, ordering labs and medications, interpreting labs and imaging results, coordination of care with consultants, and discussing plan with patient and healthcare team.
Patient encounter, including chart review, medication review, patient interview, ordering labs and medications, interpreting labs and imaging results, coordination of care with consultants, and discussing plan with patient, son at bedside, and healthcare team.

## 2025-06-11 NOTE — PROGRESS NOTE ADULT - PROBLEM SELECTOR PLAN 2
Hb 7.1 on admission with c/o "dark stool" recently, c/f GIB  Per pt- last colonosopy was 6 years ago which showed polyps  Recent EGD/colo was cancelled d/t delirium and agitation (pt attributed to ativan)  GI consulted  Continue IV PPI BID  s/p 1U pRBCs on admission with appropriate response  Iron studies c/w severe iron deficiency (iron sat 6%, ferritin 22)    NPO for possible EGD  Appreciate Cardiology clearance

## 2025-06-11 NOTE — PROGRESS NOTE ADULT - PROBLEM SELECTOR PLAN 9
- s/p perc kris 5.12.25  - Drain site C/D/I  - Outpatient follow-up with surgery
- s/p perc kris 5.12.25  - Drain site C/D/I  - Outpatient follow-up with surgery

## 2025-06-11 NOTE — PROGRESS NOTE ADULT - PROBLEM SELECTOR PLAN 3
Baseline Cr 0.7-0.9  Cr 1.4 on admission, likely prerenal  Renal US no hydropnephrosis, nonobstructing calculus    Now resolved

## 2025-06-12 ENCOUNTER — TRANSCRIPTION ENCOUNTER (OUTPATIENT)
Age: 75
End: 2025-06-12

## 2025-06-12 VITALS
DIASTOLIC BLOOD PRESSURE: 52 MMHG | RESPIRATION RATE: 18 BRPM | TEMPERATURE: 97 F | OXYGEN SATURATION: 99 % | SYSTOLIC BLOOD PRESSURE: 131 MMHG | HEART RATE: 66 BPM

## 2025-06-12 LAB
ANION GAP SERPL CALC-SCNC: 13 MMOL/L — SIGNIFICANT CHANGE UP (ref 7–14)
BUN SERPL-MCNC: 22 MG/DL — SIGNIFICANT CHANGE UP (ref 7–23)
CALCIUM SERPL-MCNC: 9.1 MG/DL — SIGNIFICANT CHANGE UP (ref 8.4–10.5)
CHLORIDE SERPL-SCNC: 101 MMOL/L — SIGNIFICANT CHANGE UP (ref 98–107)
CO2 SERPL-SCNC: 24 MMOL/L — SIGNIFICANT CHANGE UP (ref 22–31)
CREAT SERPL-MCNC: 0.85 MG/DL — SIGNIFICANT CHANGE UP (ref 0.5–1.3)
EGFR: 71 ML/MIN/1.73M2 — SIGNIFICANT CHANGE UP
EGFR: 71 ML/MIN/1.73M2 — SIGNIFICANT CHANGE UP
GLUCOSE BLDC GLUCOMTR-MCNC: 148 MG/DL — HIGH (ref 70–99)
GLUCOSE SERPL-MCNC: 129 MG/DL — HIGH (ref 70–99)
HCT VFR BLD CALC: 25 % — LOW (ref 34.5–45)
HGB BLD-MCNC: 7.8 G/DL — LOW (ref 11.5–15.5)
MAGNESIUM SERPL-MCNC: 1.5 MG/DL — LOW (ref 1.6–2.6)
MCHC RBC-ENTMCNC: 24.8 PG — LOW (ref 27–34)
MCHC RBC-ENTMCNC: 31.2 G/DL — LOW (ref 32–36)
MCV RBC AUTO: 79.6 FL — LOW (ref 80–100)
NRBC # BLD AUTO: 0 K/UL — SIGNIFICANT CHANGE UP (ref 0–0)
NRBC # FLD: 0 K/UL — SIGNIFICANT CHANGE UP (ref 0–0)
NRBC BLD AUTO-RTO: 0 /100 WBCS — SIGNIFICANT CHANGE UP (ref 0–0)
PHOSPHATE SERPL-MCNC: 4.4 MG/DL — SIGNIFICANT CHANGE UP (ref 2.5–4.5)
PLATELET # BLD AUTO: 339 K/UL — SIGNIFICANT CHANGE UP (ref 150–400)
POTASSIUM SERPL-MCNC: 3.5 MMOL/L — SIGNIFICANT CHANGE UP (ref 3.5–5.3)
POTASSIUM SERPL-SCNC: 3.5 MMOL/L — SIGNIFICANT CHANGE UP (ref 3.5–5.3)
RBC # BLD: 3.14 M/UL — LOW (ref 3.8–5.2)
RBC # FLD: 17.6 % — HIGH (ref 10.3–14.5)
SODIUM SERPL-SCNC: 138 MMOL/L — SIGNIFICANT CHANGE UP (ref 135–145)
WBC # BLD: 9.24 K/UL — SIGNIFICANT CHANGE UP (ref 3.8–10.5)
WBC # FLD AUTO: 9.24 K/UL — SIGNIFICANT CHANGE UP (ref 3.8–10.5)

## 2025-06-12 PROCEDURE — 99232 SBSQ HOSP IP/OBS MODERATE 35: CPT | Mod: GC

## 2025-06-12 PROCEDURE — 99239 HOSP IP/OBS DSCHRG MGMT >30: CPT

## 2025-06-12 RX ORDER — INSULIN LISPRO 100 U/ML
INJECTION, SOLUTION INTRAVENOUS; SUBCUTANEOUS
Refills: 0 | Status: DISCONTINUED | OUTPATIENT
Start: 2025-06-12 | End: 2025-06-12

## 2025-06-12 RX ORDER — MAGNESIUM OXIDE 400 MG
400 TABLET ORAL ONCE
Refills: 0 | Status: DISCONTINUED | OUTPATIENT
Start: 2025-06-12 | End: 2025-06-12

## 2025-06-12 RX ORDER — FERROUS SULFATE 137(45) MG
1 TABLET, EXTENDED RELEASE ORAL
Qty: 15 | Refills: 0
Start: 2025-06-12 | End: 2025-07-11

## 2025-06-12 RX ORDER — MAGNESIUM SULFATE 500 MG/ML
1 SYRINGE (ML) INJECTION ONCE
Refills: 0 | Status: DISCONTINUED | OUTPATIENT
Start: 2025-06-12 | End: 2025-06-12

## 2025-06-12 RX ORDER — INSULIN LISPRO 100 U/ML
INJECTION, SOLUTION INTRAVENOUS; SUBCUTANEOUS AT BEDTIME
Refills: 0 | Status: DISCONTINUED | OUTPATIENT
Start: 2025-06-12 | End: 2025-06-12

## 2025-06-12 RX ADMIN — METOPROLOL SUCCINATE 50 MILLIGRAM(S): 50 TABLET, EXTENDED RELEASE ORAL at 04:52

## 2025-06-12 RX ADMIN — Medication 40 MILLIGRAM(S): at 07:46

## 2025-06-12 NOTE — PROVIDER CONTACT NOTE (OTHER) - REASON
Patient removed IV, refused new Placement
Patient refused IV placement, unable to administer IV protonix

## 2025-06-12 NOTE — DISCHARGE NOTE PROVIDER - NSFOLLOWUPCLINICS_GEN_ALL_ED_FT
[facility]  Gastroenterology  [address] [place] [address] [address]  [place] [address]  Phone: [phone]  Fax:

## 2025-06-12 NOTE — CHART NOTE - NSCHARTNOTEFT_GEN_A_CORE
Discussed with patient and son bedside regarding need for cardiology appointment on discharge given that patient is a [facility] patient. Son and patient stated they have an appointment this upcoming Monday on 6/16/25 but he is unable to recall the name of the doctor and the time as the card with this information is in his car. Instructed patient and son to make sure to follow up and go to appointment.
28-Feb-2025

## 2025-06-12 NOTE — DISCHARGE NOTE PROVIDER - NSDCMRMEDTOKEN_GEN_ALL_CORE_FT
aspirin 81 mg oral capsule: 1 cap(s) orally once a day  atorvastatin 40 mg oral tablet: 1 tab(s) orally once a day  clopidogrel 75 mg oral tablet: 1 tab(s) orally once a day  D3 25 mcg (1000 intl units) oral tablet: 1 tab(s) orally once a day  glimepiride 4 mg oral tablet: 1 tab(s) orally once a day  hydroCHLOROthiazide 25 mg oral tablet: 1 tab(s) orally once a day  losartan 100 mg oral tablet: 1 tab(s) orally once a day  metFORMIN 1000 mg oral tablet, extended release: 1 tab(s) orally once a day  metoprolol succinate 50 mg oral capsule, extended release: 1 cap(s) orally once a day  PreserVision AREDS 2 oral capsule: orally once a day  sertraline 100 mg oral tablet: 1 tab(s) orally once a day   aspirin 81 mg oral capsule: 1 cap(s) orally once a day  atorvastatin 40 mg oral tablet: 1 tab(s) orally once a day  clopidogrel 75 mg oral tablet: 1 tab(s) orally once a day  D3 25 mcg (1000 intl units) oral tablet: 1 tab(s) orally once a day  ferrous sulfate 325 mg (65 mg elemental iron) oral tablet: 1 tab(s) orally every other day  glimepiride 4 mg oral tablet: 1 tab(s) orally once a day  hydroCHLOROthiazide 25 mg oral tablet: 1 tab(s) orally once a day  losartan 100 mg oral tablet: 1 tab(s) orally once a day  metFORMIN 1000 mg oral tablet, extended release: 1 tab(s) orally once a day  metoprolol succinate 50 mg oral capsule, extended release: 1 cap(s) orally once a day  PreserVision AREDS 2 oral capsule: orally once a day  sertraline 100 mg oral tablet: 1 tab(s) orally once a day

## 2025-06-12 NOTE — DISCHARGE NOTE PROVIDER - NSDCFUSCHEDAPPT_GEN_ALL_CORE_FT
Drake Little  [facility]  TRAUMASURG  [address] 74[address]  Scheduled Appointment: 06/17/2025

## 2025-06-12 NOTE — PROVIDER CONTACT NOTE (OTHER) - SITUATION
Patient removed IV, refused new Placement
Patient removed IV, refused new Placement, unable to administer IV protonic

## 2025-06-12 NOTE — PROVIDER CONTACT NOTE (OTHER) - ACTION/TREATMENT ORDERED:
ACP JAKOB Appiah made aware, Educated patient on the importance of IV access, patient verbalized understanding. Continued to refuse placement.

## 2025-06-12 NOTE — PROGRESS NOTE ADULT - SUBJECTIVE AND OBJECTIVE BOX
Cardiovascular Disease Progress Note  DATE OF SERVICE: 06-12-25 @ 08:32    Overnight events: No acute events overnight.    The patient denies chest pain or SOB.   Otherwise review of systems negative    Objective Findings:  T(C): 36.3 (06-12-25 @ 04:33), Max: 36.9 (06-11-25 @ 12:54)  HR: 66 (06-12-25 @ 04:33) (66 - 88)  BP: 131/52 (06-12-25 @ 04:33) (114/53 - 141/61)  RR: 18 (06-12-25 @ 04:33) (17 - 24)  SpO2: 99% (06-12-25 @ 04:33) (95% - 100%)  Wt(kg): --  Daily Height in cm: 157.5 (11 Jun 2025 12:54)    Daily       Physical Exam:  Gen: NAD; Patient resting comfortably  HEENT: EOMI, Normocephalic/ atraumatic  CV: RRR, normal S1 + S2, no m/r/g  Lungs:  Normal respiratory effort; clear to auscultation bilaterally  Abd: soft, non-tender; bowel sounds present  Ext: No edema; warm and well perfused    Telemetry: Sinus    Laboratory Data:                        7.8    9.24  )-----------( 339      ( 12 Jun 2025 06:10 )             25.0     06-12    138  |  101  |  22  ----------------------------<  129[H]  3.5   |  24  |  0.85    Ca    9.1      12 Jun 2025 06:10  Phos  4.4     06-12  Mg     1.50     06-12                Inpatient Medications:  MEDICATIONS  (STANDING):  aspirin enteric coated 81 milliGRAM(s) Oral daily  atorvastatin 40 milliGRAM(s) Oral at bedtime  chlorhexidine 2% Cloths 1 Application(s) Topical daily  clopidogrel Tablet 75 milliGRAM(s) Oral daily  dextrose 5%. 1000 milliLiter(s) (100 mL/Hr) IV Continuous <Continuous>  dextrose 5%. 1000 milliLiter(s) (50 mL/Hr) IV Continuous <Continuous>  dextrose 50% Injectable 25 Gram(s) IV Push once  dextrose 50% Injectable 12.5 Gram(s) IV Push once  dextrose 50% Injectable 25 Gram(s) IV Push once  glucagon  Injectable 1 milliGRAM(s) IntraMuscular once  insulin lispro (ADMELOG) corrective regimen sliding scale   SubCutaneous three times a day before meals  insulin lispro (ADMELOG) corrective regimen sliding scale   SubCutaneous at bedtime  magnesium oxide 400 milliGRAM(s) Oral once  metoprolol succinate ER 50 milliGRAM(s) Oral daily  pantoprazole    Tablet 40 milliGRAM(s) Oral before breakfast  sertraline 100 milliGRAM(s) Oral daily      Assessment:  75 year old woman with HTN, T2DM, PAD s/p bilateral leg stenting, and recent perc kris on 5.12.25 presents with anemia.     Plan of Care:    #Post operative cardiac evaluation-  Ms. Haywood tolerated EGD well.  She displays no signs or symptoms of post procedural coronary ischemia or CHF.   Recent echo noted- preserved LVEF with no significant valvular disease.   Her SOB on exertion is likely secondary to anemia.   Continue current cardiac management.     #Peripheral vascular disease-  S/p bilateral lower extremity stenting in [place] about 1 month ago.  I will defer DAPT management to her vascular team.     #HTN-  BP acceptable.     #ACP (advance care planning)-  CPT 30038  Advanced care planning was discussed with the patient.   Cardiac findings were discussed in detail and all questions were answered.     Over 55 minutes spent on total encounter; 100% of the visit was spent counseling and/or coordinating care by the attending physician.      Bob Tan MD [facility]  Cardiovascular Disease  [phone]

## 2025-06-12 NOTE — PROVIDER CONTACT NOTE (OTHER) - RECOMMENDATIONS
ACP [place] notifed, Dania discuss IV placement with patient in the morning.
ACP JAKOB Appiah  notifed,

## 2025-06-12 NOTE — DISCHARGE NOTE NURSING/CASE MANAGEMENT/SOCIAL WORK - NSDCFUADDAPPT_GEN_ALL_CORE_FT
APPTS ARE READY TO BE MADE: [X] YES    Best Family or Patient Contact (if needed): Tadeo Haywood, [phone], son      Additional Information about above appointments (if needed):    1: cardiology  2:   3:     Other comments or requests:

## 2025-06-12 NOTE — DISCHARGE NOTE PROVIDER - NSDCCPCAREPLAN_GEN_ALL_CORE_FT
PRINCIPAL DISCHARGE DIAGNOSIS  Diagnosis: SOB (shortness of breath)  Assessment and Plan of Treatment: You presented to the hospital with shortness of breath. We obtained an EKG, labs related to the heart, and an ultrasound of the heart. Your results indicate that your symptoms were unlikely due to damage to the heart. Your other labs revealed severe iron deficiency and anemia, both of which can lead to your symptoms. You were given a blood transfusion with mild improvement of your symptoms. Please follow up with your PCP on discharge to discuss whether you would be a good candidate for IV iron infusions outpatient.      SECONDARY DISCHARGE DIAGNOSES  Diagnosis: Anemia  Assessment and Plan of Treatment: You were given a blood transfusion and your blood counts improved appropriately. Our GI doctors performed an EGD which showed one recently bleeding vessel which was clipped successfully. Continue iron supplements every other day for iron deficiency (side effects include nausesa, constipation, and dark stool). Monitor for any further signs or symptoms of bleeding (including dark, tarry, sticky stool). Follow up with GI outpatient.    Diagnosis: DAVID (acute kidney injury)  Assessment and Plan of Treatment: You had some mild slowing of your kidneys when you presented to the hospital. This was likely related to mild dehydration. Your condition resolved with hydration and the blood tranfusion. Make sure you eat and drink enough on discharge to prevent further episodes of dehydration.    Diagnosis: HTN (hypertension)  Assessment and Plan of Treatment: Continue your blood pressure medicines as prescribed. Continue checking your blood pressures at home. Follow up with your PCP for further management.    Diagnosis: DM type 2 (diabetes mellitus, type 2)  Assessment and Plan of Treatment: Your A1c is lower (5.5) than previously (>6) likely due to progressive anemia. Continue your diabetes medications as prescribed. Follow up with your PCP outpatient for further diabetes management.     PRINCIPAL DISCHARGE DIAGNOSIS  Diagnosis: SOB (shortness of breath)  Assessment and Plan of Treatment: You presented to the hospital with shortness of breath. We obtained an EKG, labs related to the heart, and an ultrasound of the heart. Your results indicate that your symptoms were unlikely due to damage to the heart. Your other labs revealed severe iron deficiency and anemia, both of which can lead to your symptoms. You were given a blood transfusion with mild improvement of your symptoms. Please follow up with your PCP on discharge to discuss whether you would be a good candidate for IV iron infusions outpatient.      SECONDARY DISCHARGE DIAGNOSES  Diagnosis: Anemia  Assessment and Plan of Treatment: You were given a blood transfusion and your blood counts improved appropriately. Our GI doctors performed an EGD which showed one recently bleeding vessel which was clipped successfully. Continue iron supplements every other day for iron deficiency (side effects include nausesa, constipation, and dark stool). Monitor for any further signs or symptoms of bleeding (including dark, tarry, sticky stool). Follow up with GI outpatient.  Follow up in Gastroenterology Clinic: [phone] ([facility] at [address] [place] [address]) or [phone] ([facility] at [address] [place] [address]) or [phone] ([facility] at [address] [place] [address])  or [place] Office: [phone] ([address] [place] [address]. [place], [address])      Diagnosis: DAVID (acute kidney injury)  Assessment and Plan of Treatment: You had some mild slowing of your kidneys when you presented to the hospital. This was likely related to mild dehydration. Your condition resolved with hydration and the blood tranfusion. Make sure you eat and drink enough on discharge to prevent further episodes of dehydration.    Diagnosis: HTN (hypertension)  Assessment and Plan of Treatment: Continue your blood pressure medicines as prescribed. Continue checking your blood pressures at home. Follow up with your PCP for further management.    Diagnosis: DM type 2 (diabetes mellitus, type 2)  Assessment and Plan of Treatment: Your A1c is lower (5.5) than previously (>6) likely due to progressive anemia. Continue your diabetes medications as prescribed. Follow up with your PCP outpatient for further diabetes management.

## 2025-06-12 NOTE — DISCHARGE NOTE PROVIDER - NSDCCPTREATMENT_GEN_ALL_CORE_FT
PRINCIPAL PROCEDURE  Procedure: CXR, single AP view  Findings and Treatment: FINDINGS: Normal cardiac silhouette and normal pulmonary vasculature with   no consolidation, effusion, thorax or acute osseous finding minimal   scarring in the left lower lobe, unchanged.  IMPRESSION:  No acute radiographic findings and no change      SECONDARY PROCEDURE  Procedure: CT chest w con  Findings and Treatment: FINDINGS:  PULMONARY ARTERIES: No pulmonary embolism.  MEDIASTINUM: Heart size normal. Coronary calcifications. No pericardial   effusion. Thoracic aorta normal caliber.  No large mediastinal lymph   nodes.  AIRWAYS, LUNGS, PLEURA: Clear central airways. Emphysema. No   consolidation. No pleural effusion.  IMAGED ABDOMEN: Partially imaged catheter in the right upper quadrant.   Left adrenal nodule unchanged compared to 5/12/2025.  SOFT TISSUES: Unremarkable.  BONES: Unremarkable.  IMPRESSION:.  No pulmonary embolism.    Procedure: Ultrasound, kidney and bladder  Findings and Treatment: FINDINGS:  Right kidney: The lower pole is partially obscured by bowel gas. Measures   approximately 9.5 cm. No hydronephrosis. No renal mass or calculus   visualized.  Left kidney: 10.5 cm. No hydronephrosis. Nonobstructing lower pole   calculus measured as 1.5 x 0.8 cm. No renal mass visualized.  Urinary bladder: Within normal limits.  IMPRESSION:  *  No hydronephrosis.  *  Nonobstructing left renal calculus.    Procedure: EGD, with clip insertion  Findings and Treatment: Findings:       The examined esophagus was normal.       The Z-line was regular and was found 38 cm from the incisors.       Hematin (altered blood/coffee-ground-like material) was found in the        gastric body.       One angioectasia with stigmata of recent bleeding was found on the        greater curvature of the gastric body. For hemostasis, one hemostatic        clip was successfully placed. Unable to use APC due to patient being on        DAPT. There was no bleeding at the end of the procedure.       Localized mildly erythematous mucosa without bleeding was found in the        prepyloric region of the stomach.       The exam of the stomach was otherwise normal.       The duodenal bulb and second portion of the duodenum were normal.

## 2025-06-12 NOTE — DISCHARGE NOTE NURSING/CASE MANAGEMENT/SOCIAL WORK - PATIENT PORTAL LINK FT
You can access the FollowMyHealth Patient Portal offered by [facility] by registering at the following website: [email or web address]. By joining [employer]’s FollowMyHealth portal, you will also be able to view your health information using other applications (apps) compatible with our system.

## 2025-06-12 NOTE — PROGRESS NOTE ADULT - SUBJECTIVE AND OBJECTIVE BOX
Chief Complaint:  Patient is a 75y old  Female who presents with a chief complaint of DE LA TORRE (12 Jun 2025 08:32)    Interval Events:  s/p EGD yesterday  feels well; denies any abd pain, nausea/vomiting  no BM since yesterday    Allergies:  No Known Allergies        Hospital Medications:  acetaminophen     Tablet .. 650 milliGRAM(s) Oral every 6 hours PRN  aspirin enteric coated 81 milliGRAM(s) Oral daily  atorvastatin 40 milliGRAM(s) Oral at bedtime  chlorhexidine 2% Cloths 1 Application(s) Topical daily  clopidogrel Tablet 75 milliGRAM(s) Oral daily  dextrose 5%. 1000 milliLiter(s) IV Continuous <Continuous>  dextrose 5%. 1000 milliLiter(s) IV Continuous <Continuous>  dextrose 50% Injectable 25 Gram(s) IV Push once  dextrose 50% Injectable 12.5 Gram(s) IV Push once  dextrose 50% Injectable 25 Gram(s) IV Push once  dextrose Oral Gel 15 Gram(s) Oral once PRN  glucagon  Injectable 1 milliGRAM(s) IntraMuscular once  insulin lispro (ADMELOG) corrective regimen sliding scale   SubCutaneous three times a day before meals  insulin lispro (ADMELOG) corrective regimen sliding scale   SubCutaneous at bedtime  magnesium oxide 400 milliGRAM(s) Oral once  melatonin 3 milliGRAM(s) Oral at bedtime PRN  metoprolol succinate ER 50 milliGRAM(s) Oral daily  pantoprazole    Tablet 40 milliGRAM(s) Oral before breakfast  sertraline 100 milliGRAM(s) Oral daily      PMHX/PSHX:  HTN (hypertension)    Diabetes mellitus    Depression    Spinal stenosis    Herniated lumbar intervertebral disc    Hyperlipidemia    Paralytic ileus    Renal calculi    H/O arthroscopy of left knee    H/O bilateral cataract extraction    Previous back surgery    H/O cystoscopy        Family history:  No pertinent family history in first degree relatives        PHYSICAL EXAM:   Vital Signs:  Vital Signs Last 24 Hrs  T(C): 36.3 (12 Jun 2025 04:33), Max: 36.9 (11 Jun 2025 12:54)  T(F): 97.3 (12 Jun 2025 04:33), Max: 98.5 (11 Jun 2025 12:54)  HR: 66 (12 Jun 2025 04:33) (66 - 88)  BP: 131/52 (12 Jun 2025 04:33) (114/53 - 141/61)  BP(mean): --  RR: 18 (12 Jun 2025 04:33) (17 - 24)  SpO2: 99% (12 Jun 2025 04:33) (95% - 100%)    Parameters below as of 12 Jun 2025 04:33  Patient On (Oxygen Delivery Method): room air      Daily Height in cm: 157.5 (11 Jun 2025 12:54)    Daily     GENERAL:  No acute distress  HEENT:  no scleral icterus  CHEST:  no accessory muscle use  HEART:  Regular rate and rhythm  ABDOMEN:  Soft, non-tender, non-distended  EXTREMITIES:  No edema  SKIN:  No rash/ecchymoses  NEURO:  Alert and oriented x 3    LABS:                        7.8    9.24  )-----------( 339      ( 12 Jun 2025 06:10 )             25.0     Mean Cell Volume: 79.6 fL (06-12-25 @ 06:10)    06-12    138  |  101  |  22  ----------------------------<  129[H]  3.5   |  24  |  0.85    Ca    9.1      12 Jun 2025 06:10  Phos  4.4     06-12  Mg     1.50     06-12          Urinalysis Basic - ( 12 Jun 2025 06:10 )    Color: x / Appearance: x / SG: x / pH: x  Gluc: 129 mg/dL / Ketone: x  / Bili: x / Urobili: x   Blood: x / Protein: x / Nitrite: x   Leuk Esterase: x / RBC: x / WBC x   Sq Epi: x / Non Sq Epi: x / Bacteria: x                              7.8    9.24  )-----------( 339      ( 12 Jun 2025 06:10 )             25.0                         8.3    10.17 )-----------( 372      ( 11 Jun 2025 05:58 )             26.2                         8.2    11.20 )-----------( 347      ( 10 Chris 2025 06:22 )             25.6                         7.1    12.18 )-----------( 391      ( 09 Jun 2025 14:50 )             23.2     < from: Upper Endoscopy (06.11.25 @ 13:24) >  Findings:       The examined esophagus was normal.       The Z-line was regular and was found 38 cm from the incisors.       Hematin (altered blood/coffee-ground-like material) was found in the        gastric body.       One angioectasia with stigmata of recent bleeding was found on the        greater curvature of the gastric body. For hemostasis, one hemostatic        clip was successfully placed. Unable to use APC due to patient being on        DAPT. There was no bleeding at the end of the procedure.       Localized mildly erythematous mucosa without bleeding was found in the        prepyloric region of the stomach.       The exam of the stomach was otherwise normal.       The duodenal bulb and second portion of the duodenum were normal.                                   Impression:          - Normal esophagus.                       - Z-line regular, 38 cm from the incisors.                       - Hematin (altered blood/coffee-ground-like material) in                        the gastric body.                       - One recently bleeding angioectasia in the stomach.                        Clip was placed.                       - Erythematous mucosa in the prepyloric region of the                        stomach.    - Normal duodenal bulb and second portion of the                        duodenum.                       - No specimens collected.    < end of copied text >

## 2025-06-12 NOTE — DISCHARGE NOTE PROVIDER - NSDCFUADDAPPT_GEN_ALL_CORE_FT
APPTS ARE READY TO BE MADE: [X] YES    Best Family or Patient Contact (if needed): Tadeo Haywood, [phone], son      Additional Information about above appointments (if needed):    1: cardiology  2:   3:     Other comments or requests:    APPTS ARE READY TO BE MADE: [X] YES    Best Family or Patient Contact (if needed): Tadeo Haywood, [phone], son      Additional Information about above appointments (if needed):    1: cardiology  2: GI  3:     Other comments or requests:    APPTS ARE READY TO BE MADE: [X] YES    Best Family or Patient Contact (if needed): Tadeo Haywood, [phone], son      Additional Information about above appointments (if needed):    1: cardiology  2: GI  3:     Other comments or requests:     Gastro/Cardiology:  Provided patient with provider referral information, however patient prefers to schedule the appointments on their own.     Surgery:  Prior to outreaching the patient, it was visible that the patient has secured a follow up appointment which was not scheduled by our team.  MIKE FLORES MD,OMID CESAR 06/17/2025 04:00 PM

## 2025-06-12 NOTE — PROGRESS NOTE ADULT - ATTENDING COMMENTS
s/p EGD yesterday showing oozing AVM, s/p clip. (No APC due to DAPT). See report for full details.     Recommendations   - monitor bowel movements  - trend h/h  - outpatient GI follow up  - rest of care per primary team   GI to sign off, please call w questions

## 2025-06-12 NOTE — PROGRESS NOTE ADULT - ASSESSMENT
75 year old woman PMH HTN, PAD s/p leg stenting, recent Perc kris presenting to the ED with weeks of substernal chest pain that occasionally radiates to the back and dyspnea on exertion, suspicions for stable angina and symptomatic anemia. Significant labs on admission: (Hgb 7.1) with DAVID (pre-renal, BUN 33/Cr 1.4, eGFR 39), pro-BNP 1834, troponin 14. CXR and CTA unremarkable.   
75F with T2DM, PAD s/p bilateral leg stenting (on DAPT, last yesterday), recent perc kris on 5/12/25 presenting with 2 week history of chest pain and dyspnea on exertion with reports of dark stools    Impression  #dark stools  #anemia  Patient with PAD on DAPT (last 6/10) presenting with chest pain, DE LA TORRE, and dark stools. Reports dark stools x2 episodes; first episode last week followed by brown stools; recurrent episode of black, loose stools yesterday and this AM. No significant NSAID use; No prior EGD; colonoscopy 6 years ago with polyps. Hemodynamically stable. Labs with Hgb 7.1 s/p 1U -> 8.2 (baseline ~8); BUN/Cr 36/1.46. CTA Chest negative for PE. On exam, pt comfortable; refused rectal exam. S/P EGD 6/11 with complete exam; AVM with oozing in the gastric body s/p clipping (APC not performed due to DAPT use). Doing well post-procedurally    Recommendations  - monitor stools  - trend H/H; transfuse as needed  - will sign off at this time, however please call back with questions or should any worsening/persistent issues arise. Follow up in Gastroenterology Clinic: [phone] ([facility] at [address] [place] [address]) or [phone] ([facility] at [address] [place] [address]) or [phone] ([facility] at [address] [place] [address])  or [place] Office: [phone] ([address] [place] [address]. [address] [place], [address])    Note and recommendations are incomplete until reviewed and attested by attending.    Ирина Gayle MD  GI/Hepatology Fellow, PGY5  [employer] Pager [phone] or [employer] Pager [phone]  Teams preferred (7AM to 5PM); after 5PM, call GI fellow on call    On Weekends/Holidays (All Day) and Weekdays after 5PM to 8AM  For non-urgent consults, please email [email or web address] and [email or web address]  For urgent consults, please contact on call GI team. See Bri cruz ([facility]), Sp[facility] paging system ([facility]), or call hospital  ([facility])
75 year old woman PMH HTN, PAD s/p leg stenting, recent Perc kris presenting to the ED with weeks of substernal chest pain that occasionally radiates to the back and dyspnea on exertion, suspicions for stable angina and symptomatic anemia. Significant labs on admission: (Hgb 7.1) with DAVID (pre-renal, BUN 33/Cr 1.4, eGFR 39), pro-BNP 1834, troponin 14. CXR and CTA unremarkable.

## 2025-06-12 NOTE — DISCHARGE NOTE PROVIDER - ATTENDING DISCHARGE PHYSICAL EXAMINATION:
GENERAL: No acute distress  HEAD:  Normocephalic  EYES: Conjunctiva and sclera clear  NECK: Supple  CHEST/LUNG: CTAB  HEART: Regular rate and rhythm  ABDOMEN: Soft, non-tender, non-distended. Drain site C/D/I  EXTREMITIES:  No clubbing, cyanosis, or edema  NEUROLOGY: A&O x 3  SKIN: No rashes or lesions to visible skin

## 2025-06-12 NOTE — DISCHARGE NOTE NURSING/CASE MANAGEMENT/SOCIAL WORK - FINANCIAL ASSISTANCE
[facility] provides services at a reduced cost to those who are determined to be eligible through [facility]’s financial assistance program. Information regarding [facility]’s financial assistance program can be found by going to [email or web address] or by calling [phone].

## 2025-06-12 NOTE — PROVIDER CONTACT NOTE (OTHER) - ASSESSMENT
Patient is A&Ox4, and states she does not want and IV, She will consent for labs to be drawn
Patient is A&Ox4, and states she does not want and IV, She will consent for labs to be drawn, consents to PO meds. Patient stated she "wants to speak to the doctor that preformed her endoscopy, not the floor doctor." Patient specifically requested Dr. Gayle

## 2025-06-12 NOTE — DISCHARGE NOTE PROVIDER - HOSPITAL COURSE
HPI:  Linsey Haywood is a 75-year old woman with PMH, HTN, T2DM, PAD s/p bilateral leg stenting, recent perc kris on 5.12.25 presenting with a chronic several-week history of chest pain and dyspnea on exertion. For the past several weeks she has had exertional chest pain that radiates to her back, also associated with shortness of breath. No chest pain or SOB at rest. Has had prior work-up for angina and notes a negative stress test 1.5 years ago. She has been very lethargic and notes that she lays in bed all day, even prior to her perc kris. During this period, her appetite has also been worse, causing her to not eat or drink as much as normal. For the past couple of weeks, she's also felt dizzy upon standing. She recently traveled back from [place] where she was getting ready to get an endoscopy for the workup of dark stool. She denies fevers, chills, abd pain, n/v/d/c, dysuria, leg swelling. She has had no sick contacts. Today she was sent in by PCP due to her symptoms and soft BP.    She arrived to the ED hemodynamically stable and afebrile, received CXR and CTA both unremarkable. Labs significant for DAVID (pre-renal, BUN 33/Cr 1.4, eGFR 39), normocytic anemia (Hgb 7.1), leukocytosis (WBC 12.18). Glucose 57 (given juice, now 80), pro-BNP 1834, and trop 14. Admit to medicine for DE LA TORRE workup      (09 Jun 2025 19:23)    Hospital Course:    #Dyspnea on exertion  Patient presented with CP and DE LA TORRE likely 2/2 symptomatic anemia  Hb 7.1 on admission, no recent baseline. Iron studies c/w KARLI.  Last TTE 5/2025: EF 76%, G1DD  CXR clear, CTA neg for PE, trops neg  s/p 1U pRBCs with some improvement in symptoms    #Anemia  Hb 7.1 on admission with c/o "dark stool" recently, c/f GIB  s/p 1U pRBCs on admission with appropriate response  Pt reports she was recently scheduled for an EGD/colo which was cancelled d/t delirium and agitation (pt attributed to ativan)  GI consulted, EGD done on 6/11 which showed "One angioectasia with stigmata of recent bleeding was found on the greater curvature of the gastric body. For hemostasis, one hemostatic clip was successfully placed".  No biopsies done  Iron studies c/w severe iron deficiency (iron sat 6%, ferritin 22)- discharged on iron supplementation every other day    #DAVID (acute kidney injury).   Baseline Cr 0.7-0.9  Cr 1.4 on admission, likely prerenal  Renal US no hydropnephrosis, nonobstructing calculus  Resolved prior to DC  Home ARB and thiazide held on admission- resume on discharge    #PAD (peripheral artery disease).   - s/p two stents  - Continued aspirin 81mg, atorvastatin 40mg qd, plavix 75mg qd.    #HTN (hypertension).   - Home meds: Hydrochlorothiazide, losartan, metoprolol  - Held Hydrochlorothiazide and losartan iso DAVID- resume on discharge  - Continue Toprol 50mg qd.    #HLD (hyperlipidemia).   - Continued atorvastatin 40mg qhs.    #DM type 2 (diabetes mellitus, type 2).   - Home regimen: metformin, glipizide  - A1c 5.5, previously 6.4- likely iso progressive anemia  - LDISS while inpatient    #MDD (major depressive disorder).   - Continue home Sertraline 100 mg qd.    #History of acute cholecystitis.   - s/p perc kris 5.12.25  - Drain site C/D/I  - Outpatient follow-up with surgery.  - Drain flushed prior to discharge

## 2025-06-13 LAB
CULTURE RESULTS: SIGNIFICANT CHANGE UP
SPECIMEN SOURCE: SIGNIFICANT CHANGE UP

## 2025-06-17 ENCOUNTER — INPATIENT (INPATIENT)
Facility: HOSPITAL | Age: 75
LOS: 3 days | Discharge: HOME | End: 2025-06-21
Attending: INTERNAL MEDICINE | Admitting: INTERNAL MEDICINE
Payer: MEDICARE

## 2025-06-17 ENCOUNTER — APPOINTMENT (OUTPATIENT)
Dept: TRAUMA SURGERY | Facility: HOSPITAL | Age: 75
End: 2025-06-17

## 2025-06-17 VITALS
HEART RATE: 75 BPM | DIASTOLIC BLOOD PRESSURE: 75 MMHG | TEMPERATURE: 98 F | OXYGEN SATURATION: 98 % | HEIGHT: 62 IN | SYSTOLIC BLOOD PRESSURE: 124 MMHG | WEIGHT: 123.9 LBS | RESPIRATION RATE: 16 BRPM

## 2025-06-17 VITALS
TEMPERATURE: 97 F | BODY MASS INDEX: 23.37 KG/M2 | HEIGHT: 62 IN | HEART RATE: 88 BPM | WEIGHT: 127 LBS | DIASTOLIC BLOOD PRESSURE: 48 MMHG | SYSTOLIC BLOOD PRESSURE: 131 MMHG

## 2025-06-17 DIAGNOSIS — D64.9 ANEMIA, UNSPECIFIED: ICD-10-CM

## 2025-06-17 DIAGNOSIS — Z98.41 CATARACT EXTRACTION STATUS, RIGHT EYE: Chronic | ICD-10-CM

## 2025-06-17 DIAGNOSIS — Z98.89 OTHER SPECIFIED POSTPROCEDURAL STATES: Chronic | ICD-10-CM

## 2025-06-17 DIAGNOSIS — Z98.890 OTHER SPECIFIED POSTPROCEDURAL STATES: Chronic | ICD-10-CM

## 2025-06-17 LAB
ALBUMIN SERPL ELPH-MCNC: 3.5 G/DL — SIGNIFICANT CHANGE UP (ref 3.3–5)
ALP SERPL-CCNC: 91 U/L — SIGNIFICANT CHANGE UP (ref 40–120)
ALT FLD-CCNC: 6 U/L — SIGNIFICANT CHANGE UP (ref 4–33)
ANION GAP SERPL CALC-SCNC: 16 MMOL/L — HIGH (ref 7–14)
APTT BLD: 34.9 SEC — SIGNIFICANT CHANGE UP (ref 26.1–36.8)
AST SERPL-CCNC: 13 U/L — SIGNIFICANT CHANGE UP (ref 4–32)
BASOPHILS # BLD AUTO: 0.13 K/UL — SIGNIFICANT CHANGE UP (ref 0–0.2)
BASOPHILS NFR BLD AUTO: 1.1 % — SIGNIFICANT CHANGE UP (ref 0–2)
BILIRUB SERPL-MCNC: 0.2 MG/DL — SIGNIFICANT CHANGE UP (ref 0.2–1.2)
BLD GP AB SCN SERPL QL: NEGATIVE — SIGNIFICANT CHANGE UP
BUN SERPL-MCNC: 36 MG/DL — HIGH (ref 7–23)
CALCIUM SERPL-MCNC: 9.7 MG/DL — SIGNIFICANT CHANGE UP (ref 8.4–10.5)
CHLORIDE SERPL-SCNC: 102 MMOL/L — SIGNIFICANT CHANGE UP (ref 98–107)
CO2 SERPL-SCNC: 21 MMOL/L — LOW (ref 22–31)
CREAT SERPL-MCNC: 1.66 MG/DL — HIGH (ref 0.5–1.3)
EGFR: 32 ML/MIN/1.73M2 — LOW
EGFR: 32 ML/MIN/1.73M2 — LOW
EOSINOPHIL # BLD AUTO: 0.29 K/UL — SIGNIFICANT CHANGE UP (ref 0–0.5)
EOSINOPHIL NFR BLD AUTO: 2.4 % — SIGNIFICANT CHANGE UP (ref 0–6)
FLUAV AG NPH QL: SIGNIFICANT CHANGE UP
FLUBV AG NPH QL: SIGNIFICANT CHANGE UP
GAS PNL BLDV: SIGNIFICANT CHANGE UP
GLUCOSE SERPL-MCNC: 57 MG/DL — LOW (ref 70–99)
HCT VFR BLD CALC: 23.5 % — LOW (ref 34.5–45)
HGB BLD-MCNC: 7.2 G/DL — LOW (ref 11.5–15.5)
IMM GRANULOCYTES # BLD AUTO: 0.11 K/UL — HIGH (ref 0–0.07)
IMM GRANULOCYTES NFR BLD AUTO: 0.9 % — SIGNIFICANT CHANGE UP (ref 0–0.9)
INR BLD: 1.15 RATIO — SIGNIFICANT CHANGE UP (ref 0.85–1.16)
LYMPHOCYTES # BLD AUTO: 2.27 K/UL — SIGNIFICANT CHANGE UP (ref 1–3.3)
LYMPHOCYTES NFR BLD AUTO: 18.5 % — SIGNIFICANT CHANGE UP (ref 13–44)
MAGNESIUM SERPL-MCNC: 1.6 MG/DL — SIGNIFICANT CHANGE UP (ref 1.6–2.6)
MCHC RBC-ENTMCNC: 24.7 PG — LOW (ref 27–34)
MCHC RBC-ENTMCNC: 30.6 G/DL — LOW (ref 32–36)
MCV RBC AUTO: 80.8 FL — SIGNIFICANT CHANGE UP (ref 80–100)
MONOCYTES # BLD AUTO: 0.86 K/UL — SIGNIFICANT CHANGE UP (ref 0–0.9)
MONOCYTES NFR BLD AUTO: 7 % — SIGNIFICANT CHANGE UP (ref 2–14)
NEUTROPHILS # BLD AUTO: 8.63 K/UL — HIGH (ref 1.8–7.4)
NEUTROPHILS NFR BLD AUTO: 70.1 % — SIGNIFICANT CHANGE UP (ref 43–77)
NRBC # BLD AUTO: 0 K/UL — SIGNIFICANT CHANGE UP (ref 0–0)
NRBC # FLD: 0 K/UL — SIGNIFICANT CHANGE UP (ref 0–0)
NRBC BLD AUTO-RTO: 0 /100 WBCS — SIGNIFICANT CHANGE UP (ref 0–0)
OB PNL STL: POSITIVE
PLATELET # BLD AUTO: 446 K/UL — HIGH (ref 150–400)
PMV BLD: 10.5 FL — SIGNIFICANT CHANGE UP (ref 7–13)
POTASSIUM SERPL-MCNC: 4.4 MMOL/L — SIGNIFICANT CHANGE UP (ref 3.5–5.3)
POTASSIUM SERPL-SCNC: 4.4 MMOL/L — SIGNIFICANT CHANGE UP (ref 3.5–5.3)
PROT SERPL-MCNC: 6.6 G/DL — SIGNIFICANT CHANGE UP (ref 6–8.3)
PROTHROM AB SERPL-ACNC: 13.3 SEC — SIGNIFICANT CHANGE UP (ref 9.9–13.4)
RBC # BLD: 2.91 M/UL — LOW (ref 3.8–5.2)
RBC # FLD: 18 % — HIGH (ref 10.3–14.5)
RH IG SCN BLD-IMP: POSITIVE — SIGNIFICANT CHANGE UP
RSV RNA NPH QL NAA+NON-PROBE: SIGNIFICANT CHANGE UP
SARS-COV-2 RNA SPEC QL NAA+PROBE: SIGNIFICANT CHANGE UP
SODIUM SERPL-SCNC: 139 MMOL/L — SIGNIFICANT CHANGE UP (ref 135–145)
SOURCE RESPIRATORY: SIGNIFICANT CHANGE UP
TROPONIN T, HIGH SENSITIVITY RESULT: 13 NG/L — SIGNIFICANT CHANGE UP
TROPONIN T, HIGH SENSITIVITY RESULT: 17 NG/L — SIGNIFICANT CHANGE UP
WBC # BLD: 12.29 K/UL — HIGH (ref 3.8–10.5)
WBC # FLD AUTO: 12.29 K/UL — HIGH (ref 3.8–10.5)

## 2025-06-17 PROCEDURE — 71046 X-RAY EXAM CHEST 2 VIEWS: CPT | Mod: 26

## 2025-06-17 PROCEDURE — 99285 EMERGENCY DEPT VISIT HI MDM: CPT | Mod: GC

## 2025-06-17 RX ORDER — DEXTROSE 50 % IN WATER 50 %
50 SYRINGE (ML) INTRAVENOUS ONCE
Refills: 0 | Status: COMPLETED | OUTPATIENT
Start: 2025-06-17 | End: 2025-06-17

## 2025-06-17 RX ADMIN — Medication 1000 MILLILITER(S): at 20:49

## 2025-06-17 RX ADMIN — Medication 50 MILLILITER(S): at 20:48

## 2025-06-18 DIAGNOSIS — D62 ACUTE POSTHEMORRHAGIC ANEMIA: ICD-10-CM

## 2025-06-18 DIAGNOSIS — N17.9 ACUTE KIDNEY FAILURE, UNSPECIFIED: ICD-10-CM

## 2025-06-18 DIAGNOSIS — I10 ESSENTIAL (PRIMARY) HYPERTENSION: ICD-10-CM

## 2025-06-18 DIAGNOSIS — Z98.890 OTHER SPECIFIED POSTPROCEDURAL STATES: ICD-10-CM

## 2025-06-18 DIAGNOSIS — R07.9 CHEST PAIN, UNSPECIFIED: ICD-10-CM

## 2025-06-18 DIAGNOSIS — R63.8 OTHER SYMPTOMS AND SIGNS CONCERNING FOOD AND FLUID INTAKE: ICD-10-CM

## 2025-06-18 DIAGNOSIS — Z71.89 OTHER SPECIFIED COUNSELING: ICD-10-CM

## 2025-06-18 DIAGNOSIS — I73.9 PERIPHERAL VASCULAR DISEASE, UNSPECIFIED: ICD-10-CM

## 2025-06-18 DIAGNOSIS — E11.9 TYPE 2 DIABETES MELLITUS WITHOUT COMPLICATIONS: ICD-10-CM

## 2025-06-18 LAB
ALBUMIN SERPL ELPH-MCNC: 3.2 G/DL — LOW (ref 3.3–5)
ALP SERPL-CCNC: 81 U/L — SIGNIFICANT CHANGE UP (ref 40–120)
ALT FLD-CCNC: 7 U/L — SIGNIFICANT CHANGE UP (ref 4–33)
ANION GAP SERPL CALC-SCNC: 14 MMOL/L — SIGNIFICANT CHANGE UP (ref 7–14)
AST SERPL-CCNC: 13 U/L — SIGNIFICANT CHANGE UP (ref 4–32)
BASOPHILS # BLD AUTO: 0.12 K/UL — SIGNIFICANT CHANGE UP (ref 0–0.2)
BASOPHILS NFR BLD AUTO: 1.1 % — SIGNIFICANT CHANGE UP (ref 0–2)
BILIRUB SERPL-MCNC: 0.7 MG/DL — SIGNIFICANT CHANGE UP (ref 0.2–1.2)
BUN SERPL-MCNC: 30 MG/DL — HIGH (ref 7–23)
CALCIUM SERPL-MCNC: 8.6 MG/DL — SIGNIFICANT CHANGE UP (ref 8.4–10.5)
CHLORIDE SERPL-SCNC: 106 MMOL/L — SIGNIFICANT CHANGE UP (ref 98–107)
CO2 SERPL-SCNC: 21 MMOL/L — LOW (ref 22–31)
CREAT SERPL-MCNC: 1.14 MG/DL — SIGNIFICANT CHANGE UP (ref 0.5–1.3)
EGFR: 50 ML/MIN/1.73M2 — LOW
EGFR: 50 ML/MIN/1.73M2 — LOW
EOSINOPHIL # BLD AUTO: 0.36 K/UL — SIGNIFICANT CHANGE UP (ref 0–0.5)
EOSINOPHIL NFR BLD AUTO: 3.4 % — SIGNIFICANT CHANGE UP (ref 0–6)
GLUCOSE BLDC GLUCOMTR-MCNC: 106 MG/DL — HIGH (ref 70–99)
GLUCOSE SERPL-MCNC: 55 MG/DL — LOW (ref 70–99)
HCT VFR BLD CALC: 29.2 % — LOW (ref 34.5–45)
HGB BLD-MCNC: 9.2 G/DL — LOW (ref 11.5–15.5)
IMM GRANULOCYTES # BLD AUTO: 0.09 K/UL — HIGH (ref 0–0.07)
IMM GRANULOCYTES NFR BLD AUTO: 0.9 % — SIGNIFICANT CHANGE UP (ref 0–0.9)
LYMPHOCYTES # BLD AUTO: 1.77 K/UL — SIGNIFICANT CHANGE UP (ref 1–3.3)
LYMPHOCYTES NFR BLD AUTO: 16.9 % — SIGNIFICANT CHANGE UP (ref 13–44)
MAGNESIUM SERPL-MCNC: 1.5 MG/DL — LOW (ref 1.6–2.6)
MCHC RBC-ENTMCNC: 26.2 PG — LOW (ref 27–34)
MCHC RBC-ENTMCNC: 31.5 G/DL — LOW (ref 32–36)
MCV RBC AUTO: 83.2 FL — SIGNIFICANT CHANGE UP (ref 80–100)
MONOCYTES # BLD AUTO: 0.85 K/UL — SIGNIFICANT CHANGE UP (ref 0–0.9)
MONOCYTES NFR BLD AUTO: 8.1 % — SIGNIFICANT CHANGE UP (ref 2–14)
NEUTROPHILS # BLD AUTO: 7.31 K/UL — SIGNIFICANT CHANGE UP (ref 1.8–7.4)
NEUTROPHILS NFR BLD AUTO: 69.6 % — SIGNIFICANT CHANGE UP (ref 43–77)
NRBC # BLD AUTO: 0 K/UL — SIGNIFICANT CHANGE UP (ref 0–0)
NRBC # FLD: 0 K/UL — SIGNIFICANT CHANGE UP (ref 0–0)
NRBC BLD AUTO-RTO: 0 /100 WBCS — SIGNIFICANT CHANGE UP (ref 0–0)
PHOSPHATE SERPL-MCNC: 3.3 MG/DL — SIGNIFICANT CHANGE UP (ref 2.5–4.5)
PLATELET # BLD AUTO: 406 K/UL — HIGH (ref 150–400)
PMV BLD: 10.5 FL — SIGNIFICANT CHANGE UP (ref 7–13)
POTASSIUM SERPL-MCNC: 3.4 MMOL/L — LOW (ref 3.5–5.3)
POTASSIUM SERPL-SCNC: 3.4 MMOL/L — LOW (ref 3.5–5.3)
PROT SERPL-MCNC: 5.9 G/DL — LOW (ref 6–8.3)
RBC # BLD: 3.51 M/UL — LOW (ref 3.8–5.2)
RBC # FLD: 16.9 % — HIGH (ref 10.3–14.5)
SODIUM SERPL-SCNC: 141 MMOL/L — SIGNIFICANT CHANGE UP (ref 135–145)
WBC # BLD: 10.5 K/UL — SIGNIFICANT CHANGE UP (ref 3.8–10.5)
WBC # FLD AUTO: 10.5 K/UL — SIGNIFICANT CHANGE UP (ref 3.8–10.5)

## 2025-06-18 PROCEDURE — 99223 1ST HOSP IP/OBS HIGH 75: CPT

## 2025-06-18 PROCEDURE — 99221 1ST HOSP IP/OBS SF/LOW 40: CPT | Mod: GC

## 2025-06-18 RX ORDER — INSULIN LISPRO 100 U/ML
INJECTION, SOLUTION INTRAVENOUS; SUBCUTANEOUS
Refills: 0 | Status: DISCONTINUED | OUTPATIENT
Start: 2025-06-18 | End: 2025-06-21

## 2025-06-18 RX ORDER — METOPROLOL SUCCINATE 50 MG/1
25 TABLET, EXTENDED RELEASE ORAL
Refills: 0 | Status: DISCONTINUED | OUTPATIENT
Start: 2025-06-18 | End: 2025-06-21

## 2025-06-18 RX ORDER — GLUCAGON 3 MG/1
1 POWDER NASAL ONCE
Refills: 0 | Status: DISCONTINUED | OUTPATIENT
Start: 2025-06-18 | End: 2025-06-21

## 2025-06-18 RX ORDER — DEXTROSE 50 % IN WATER 50 %
15 SYRINGE (ML) INTRAVENOUS ONCE
Refills: 0 | Status: DISCONTINUED | OUTPATIENT
Start: 2025-06-18 | End: 2025-06-21

## 2025-06-18 RX ORDER — ACETAMINOPHEN 500 MG/5ML
650 LIQUID (ML) ORAL EVERY 6 HOURS
Refills: 0 | Status: DISCONTINUED | OUTPATIENT
Start: 2025-06-18 | End: 2025-06-21

## 2025-06-18 RX ORDER — MAGNESIUM SULFATE 500 MG/ML
2 SYRINGE (ML) INJECTION ONCE
Refills: 0 | Status: COMPLETED | OUTPATIENT
Start: 2025-06-18 | End: 2025-06-18

## 2025-06-18 RX ORDER — CLOPIDOGREL BISULFATE 75 MG/1
1 TABLET, FILM COATED ORAL
Refills: 0 | DISCHARGE

## 2025-06-18 RX ORDER — DEXTROSE 50 % IN WATER 50 %
12.5 SYRINGE (ML) INTRAVENOUS ONCE
Refills: 0 | Status: COMPLETED | OUTPATIENT
Start: 2025-06-18 | End: 2025-06-18

## 2025-06-18 RX ORDER — SODIUM CHLORIDE 9 G/1000ML
1000 INJECTION, SOLUTION INTRAVENOUS
Refills: 0 | Status: DISCONTINUED | OUTPATIENT
Start: 2025-06-18 | End: 2025-06-21

## 2025-06-18 RX ORDER — INSULIN LISPRO 100 U/ML
INJECTION, SOLUTION INTRAVENOUS; SUBCUTANEOUS EVERY 6 HOURS
Refills: 0 | Status: DISCONTINUED | OUTPATIENT
Start: 2025-06-18 | End: 2025-06-18

## 2025-06-18 RX ORDER — DEXTROSE 50 % IN WATER 50 %
25 SYRINGE (ML) INTRAVENOUS ONCE
Refills: 0 | Status: DISCONTINUED | OUTPATIENT
Start: 2025-06-18 | End: 2025-06-21

## 2025-06-18 RX ORDER — DEXTROSE 50 % IN WATER 50 %
12.5 SYRINGE (ML) INTRAVENOUS ONCE
Refills: 0 | Status: DISCONTINUED | OUTPATIENT
Start: 2025-06-18 | End: 2025-06-21

## 2025-06-18 RX ORDER — SERTRALINE 100 MG/1
100 TABLET, FILM COATED ORAL DAILY
Refills: 0 | Status: DISCONTINUED | OUTPATIENT
Start: 2025-06-18 | End: 2025-06-21

## 2025-06-18 RX ORDER — SODIUM CHLORIDE 9 G/1000ML
1000 INJECTION, SOLUTION INTRAVENOUS
Refills: 0 | Status: DISCONTINUED | OUTPATIENT
Start: 2025-06-18 | End: 2025-06-20

## 2025-06-18 RX ORDER — DEXTROSE 50 % IN WATER 50 %
25 SYRINGE (ML) INTRAVENOUS ONCE
Refills: 0 | Status: COMPLETED | OUTPATIENT
Start: 2025-06-18 | End: 2025-06-18

## 2025-06-18 RX ORDER — INSULIN LISPRO 100 U/ML
INJECTION, SOLUTION INTRAVENOUS; SUBCUTANEOUS AT BEDTIME
Refills: 0 | Status: DISCONTINUED | OUTPATIENT
Start: 2025-06-18 | End: 2025-06-21

## 2025-06-18 RX ORDER — ATORVASTATIN CALCIUM 80 MG/1
40 TABLET, FILM COATED ORAL AT BEDTIME
Refills: 0 | Status: DISCONTINUED | OUTPATIENT
Start: 2025-06-18 | End: 2025-06-21

## 2025-06-18 RX ORDER — AMLODIPINE BESYLATE 10 MG/1
5 TABLET ORAL DAILY
Refills: 0 | Status: DISCONTINUED | OUTPATIENT
Start: 2025-06-18 | End: 2025-06-21

## 2025-06-18 RX ADMIN — Medication 100 MILLIEQUIVALENT(S): at 10:38

## 2025-06-18 RX ADMIN — METOPROLOL SUCCINATE 25 MILLIGRAM(S): 50 TABLET, EXTENDED RELEASE ORAL at 05:16

## 2025-06-18 RX ADMIN — Medication 25 MILLILITER(S): at 09:25

## 2025-06-18 RX ADMIN — SERTRALINE 100 MILLIGRAM(S): 100 TABLET, FILM COATED ORAL at 11:38

## 2025-06-18 RX ADMIN — METOPROLOL SUCCINATE 25 MILLIGRAM(S): 50 TABLET, EXTENDED RELEASE ORAL at 17:14

## 2025-06-18 RX ADMIN — Medication 100 MILLIEQUIVALENT(S): at 09:30

## 2025-06-18 RX ADMIN — ATORVASTATIN CALCIUM 40 MILLIGRAM(S): 80 TABLET, FILM COATED ORAL at 21:39

## 2025-06-18 RX ADMIN — AMLODIPINE BESYLATE 5 MILLIGRAM(S): 10 TABLET ORAL at 05:16

## 2025-06-18 RX ADMIN — SODIUM CHLORIDE 50 MILLILITER(S): 9 INJECTION, SOLUTION INTRAVENOUS at 21:52

## 2025-06-18 RX ADMIN — Medication 25 GRAM(S): at 09:30

## 2025-06-18 RX ADMIN — Medication 100 MILLILITER(S): at 11:40

## 2025-06-18 RX ADMIN — Medication 1000 UNIT(S): at 11:36

## 2025-06-18 RX ADMIN — Medication 40 MILLIGRAM(S): at 05:17

## 2025-06-18 RX ADMIN — Medication 100 MILLIEQUIVALENT(S): at 12:07

## 2025-06-19 LAB
ALBUMIN SERPL ELPH-MCNC: 3.1 G/DL — LOW (ref 3.3–5)
ALP SERPL-CCNC: 84 U/L — SIGNIFICANT CHANGE UP (ref 40–120)
ALT FLD-CCNC: 6 U/L — SIGNIFICANT CHANGE UP (ref 4–33)
ANION GAP SERPL CALC-SCNC: 14 MMOL/L — SIGNIFICANT CHANGE UP (ref 7–14)
AST SERPL-CCNC: 14 U/L — SIGNIFICANT CHANGE UP (ref 4–32)
BASOPHILS # BLD AUTO: 0.11 K/UL — SIGNIFICANT CHANGE UP (ref 0–0.2)
BASOPHILS NFR BLD AUTO: 1.4 % — SIGNIFICANT CHANGE UP (ref 0–2)
BILIRUB SERPL-MCNC: 0.3 MG/DL — SIGNIFICANT CHANGE UP (ref 0.2–1.2)
BUN SERPL-MCNC: 21 MG/DL — SIGNIFICANT CHANGE UP (ref 7–23)
CALCIUM SERPL-MCNC: 8.8 MG/DL — SIGNIFICANT CHANGE UP (ref 8.4–10.5)
CHLORIDE SERPL-SCNC: 105 MMOL/L — SIGNIFICANT CHANGE UP (ref 98–107)
CO2 SERPL-SCNC: 22 MMOL/L — SIGNIFICANT CHANGE UP (ref 22–31)
CREAT SERPL-MCNC: 0.72 MG/DL — SIGNIFICANT CHANGE UP (ref 0.5–1.3)
EGFR: 86 ML/MIN/1.73M2 — SIGNIFICANT CHANGE UP
EGFR: 86 ML/MIN/1.73M2 — SIGNIFICANT CHANGE UP
EOSINOPHIL # BLD AUTO: 0.38 K/UL — SIGNIFICANT CHANGE UP (ref 0–0.5)
EOSINOPHIL NFR BLD AUTO: 4.7 % — SIGNIFICANT CHANGE UP (ref 0–6)
FRUCTOSAMINE SERPL-MCNC: 178 UMOL/L — LOW (ref 205–285)
GLUCOSE SERPL-MCNC: 146 MG/DL — HIGH (ref 70–99)
HCT VFR BLD CALC: 30 % — LOW (ref 34.5–45)
HGB BLD-MCNC: 9.6 G/DL — LOW (ref 11.5–15.5)
IMM GRANULOCYTES # BLD AUTO: 0.06 K/UL — SIGNIFICANT CHANGE UP (ref 0–0.07)
IMM GRANULOCYTES NFR BLD AUTO: 0.7 % — SIGNIFICANT CHANGE UP (ref 0–0.9)
LYMPHOCYTES # BLD AUTO: 1.77 K/UL — SIGNIFICANT CHANGE UP (ref 1–3.3)
LYMPHOCYTES NFR BLD AUTO: 21.8 % — SIGNIFICANT CHANGE UP (ref 13–44)
MAGNESIUM SERPL-MCNC: 1.6 MG/DL — SIGNIFICANT CHANGE UP (ref 1.6–2.6)
MCHC RBC-ENTMCNC: 26.1 PG — LOW (ref 27–34)
MCHC RBC-ENTMCNC: 32 G/DL — SIGNIFICANT CHANGE UP (ref 32–36)
MCV RBC AUTO: 81.5 FL — SIGNIFICANT CHANGE UP (ref 80–100)
MONOCYTES # BLD AUTO: 0.7 K/UL — SIGNIFICANT CHANGE UP (ref 0–0.9)
MONOCYTES NFR BLD AUTO: 8.6 % — SIGNIFICANT CHANGE UP (ref 2–14)
NEUTROPHILS # BLD AUTO: 5.11 K/UL — SIGNIFICANT CHANGE UP (ref 1.8–7.4)
NEUTROPHILS NFR BLD AUTO: 62.8 % — SIGNIFICANT CHANGE UP (ref 43–77)
NRBC # BLD AUTO: 0 K/UL — SIGNIFICANT CHANGE UP (ref 0–0)
NRBC # FLD: 0 K/UL — SIGNIFICANT CHANGE UP (ref 0–0)
NRBC BLD AUTO-RTO: 0 /100 WBCS — SIGNIFICANT CHANGE UP (ref 0–0)
PHOSPHATE SERPL-MCNC: 2.7 MG/DL — SIGNIFICANT CHANGE UP (ref 2.5–4.5)
PLATELET # BLD AUTO: 417 K/UL — HIGH (ref 150–400)
PMV BLD: 10.5 FL — SIGNIFICANT CHANGE UP (ref 7–13)
POTASSIUM SERPL-MCNC: 3.7 MMOL/L — SIGNIFICANT CHANGE UP (ref 3.5–5.3)
POTASSIUM SERPL-SCNC: 3.7 MMOL/L — SIGNIFICANT CHANGE UP (ref 3.5–5.3)
PROT SERPL-MCNC: 5.9 G/DL — LOW (ref 6–8.3)
RBC # BLD: 3.68 M/UL — LOW (ref 3.8–5.2)
RBC # FLD: 17.1 % — HIGH (ref 10.3–14.5)
SODIUM SERPL-SCNC: 141 MMOL/L — SIGNIFICANT CHANGE UP (ref 135–145)
WBC # BLD: 8.13 K/UL — SIGNIFICANT CHANGE UP (ref 3.8–10.5)
WBC # FLD AUTO: 8.13 K/UL — SIGNIFICANT CHANGE UP (ref 3.8–10.5)

## 2025-06-19 PROCEDURE — 99232 SBSQ HOSP IP/OBS MODERATE 35: CPT | Mod: GC

## 2025-06-19 PROCEDURE — 99233 SBSQ HOSP IP/OBS HIGH 50: CPT | Mod: GC

## 2025-06-19 RX ORDER — ASPIRIN 325 MG
81 TABLET ORAL DAILY
Refills: 0 | Status: DISCONTINUED | OUTPATIENT
Start: 2025-06-19 | End: 2025-06-21

## 2025-06-19 RX ORDER — CLOPIDOGREL BISULFATE 75 MG/1
75 TABLET, FILM COATED ORAL DAILY
Refills: 0 | Status: DISCONTINUED | OUTPATIENT
Start: 2025-06-19 | End: 2025-06-21

## 2025-06-19 RX ADMIN — METOPROLOL SUCCINATE 25 MILLIGRAM(S): 50 TABLET, EXTENDED RELEASE ORAL at 08:55

## 2025-06-19 RX ADMIN — Medication 1 APPLICATION(S): at 12:03

## 2025-06-19 RX ADMIN — INSULIN LISPRO 2: 100 INJECTION, SOLUTION INTRAVENOUS; SUBCUTANEOUS at 17:13

## 2025-06-19 RX ADMIN — AMLODIPINE BESYLATE 5 MILLIGRAM(S): 10 TABLET ORAL at 05:56

## 2025-06-19 RX ADMIN — INSULIN LISPRO 1: 100 INJECTION, SOLUTION INTRAVENOUS; SUBCUTANEOUS at 11:59

## 2025-06-19 RX ADMIN — CLOPIDOGREL BISULFATE 75 MILLIGRAM(S): 75 TABLET, FILM COATED ORAL at 14:55

## 2025-06-19 RX ADMIN — INSULIN LISPRO 1: 100 INJECTION, SOLUTION INTRAVENOUS; SUBCUTANEOUS at 21:45

## 2025-06-19 RX ADMIN — Medication 81 MILLIGRAM(S): at 14:55

## 2025-06-19 RX ADMIN — Medication 1000 UNIT(S): at 12:03

## 2025-06-19 RX ADMIN — ATORVASTATIN CALCIUM 40 MILLIGRAM(S): 80 TABLET, FILM COATED ORAL at 21:42

## 2025-06-19 RX ADMIN — SERTRALINE 100 MILLIGRAM(S): 100 TABLET, FILM COATED ORAL at 12:03

## 2025-06-19 RX ADMIN — Medication 40 MILLIGRAM(S): at 05:58

## 2025-06-19 RX ADMIN — METOPROLOL SUCCINATE 25 MILLIGRAM(S): 50 TABLET, EXTENDED RELEASE ORAL at 17:37

## 2025-06-20 ENCOUNTER — TRANSCRIPTION ENCOUNTER (OUTPATIENT)
Age: 75
End: 2025-06-20

## 2025-06-20 LAB
ALBUMIN SERPL ELPH-MCNC: 3 G/DL — LOW (ref 3.3–5)
ALP SERPL-CCNC: 79 U/L — SIGNIFICANT CHANGE UP (ref 40–120)
ALT FLD-CCNC: <5 U/L — SIGNIFICANT CHANGE UP (ref 4–33)
ANION GAP SERPL CALC-SCNC: 11 MMOL/L — SIGNIFICANT CHANGE UP (ref 7–14)
AST SERPL-CCNC: 12 U/L — SIGNIFICANT CHANGE UP (ref 4–32)
BASOPHILS # BLD AUTO: 0.12 K/UL — SIGNIFICANT CHANGE UP (ref 0–0.2)
BASOPHILS NFR BLD AUTO: 1.4 % — SIGNIFICANT CHANGE UP (ref 0–2)
BILIRUB SERPL-MCNC: 0.3 MG/DL — SIGNIFICANT CHANGE UP (ref 0.2–1.2)
BUN SERPL-MCNC: 17 MG/DL — SIGNIFICANT CHANGE UP (ref 7–23)
CALCIUM SERPL-MCNC: 8.8 MG/DL — SIGNIFICANT CHANGE UP (ref 8.4–10.5)
CHLORIDE SERPL-SCNC: 106 MMOL/L — SIGNIFICANT CHANGE UP (ref 98–107)
CO2 SERPL-SCNC: 23 MMOL/L — SIGNIFICANT CHANGE UP (ref 22–31)
CREAT SERPL-MCNC: 0.69 MG/DL — SIGNIFICANT CHANGE UP (ref 0.5–1.3)
EGFR: 89 ML/MIN/1.73M2 — SIGNIFICANT CHANGE UP
EGFR: 89 ML/MIN/1.73M2 — SIGNIFICANT CHANGE UP
EOSINOPHIL # BLD AUTO: 0.41 K/UL — SIGNIFICANT CHANGE UP (ref 0–0.5)
EOSINOPHIL NFR BLD AUTO: 4.8 % — SIGNIFICANT CHANGE UP (ref 0–6)
GLUCOSE SERPL-MCNC: 153 MG/DL — HIGH (ref 70–99)
HCT VFR BLD CALC: 27.7 % — LOW (ref 34.5–45)
HCT VFR BLD CALC: 28.3 % — LOW (ref 34.5–45)
HGB BLD-MCNC: 8.7 G/DL — LOW (ref 11.5–15.5)
HGB BLD-MCNC: 8.9 G/DL — LOW (ref 11.5–15.5)
IMM GRANULOCYTES # BLD AUTO: 0.09 K/UL — HIGH (ref 0–0.07)
IMM GRANULOCYTES NFR BLD AUTO: 1.1 % — HIGH (ref 0–0.9)
LACTATE SERPL-SCNC: 0.8 MMOL/L — SIGNIFICANT CHANGE UP (ref 0.5–2)
LYMPHOCYTES # BLD AUTO: 2.07 K/UL — SIGNIFICANT CHANGE UP (ref 1–3.3)
LYMPHOCYTES NFR BLD AUTO: 24.2 % — SIGNIFICANT CHANGE UP (ref 13–44)
MAGNESIUM SERPL-MCNC: 1.4 MG/DL — LOW (ref 1.6–2.6)
MCHC RBC-ENTMCNC: 25.7 PG — LOW (ref 27–34)
MCHC RBC-ENTMCNC: 25.8 PG — LOW (ref 27–34)
MCHC RBC-ENTMCNC: 31.4 G/DL — LOW (ref 32–36)
MCHC RBC-ENTMCNC: 31.4 G/DL — LOW (ref 32–36)
MCV RBC AUTO: 82 FL — SIGNIFICANT CHANGE UP (ref 80–100)
MCV RBC AUTO: 82 FL — SIGNIFICANT CHANGE UP (ref 80–100)
MONOCYTES # BLD AUTO: 0.71 K/UL — SIGNIFICANT CHANGE UP (ref 0–0.9)
MONOCYTES NFR BLD AUTO: 8.3 % — SIGNIFICANT CHANGE UP (ref 2–14)
NEUTROPHILS # BLD AUTO: 5.14 K/UL — SIGNIFICANT CHANGE UP (ref 1.8–7.4)
NEUTROPHILS NFR BLD AUTO: 60.2 % — SIGNIFICANT CHANGE UP (ref 43–77)
NRBC # BLD AUTO: 0 K/UL — SIGNIFICANT CHANGE UP (ref 0–0)
NRBC # BLD AUTO: 0 K/UL — SIGNIFICANT CHANGE UP (ref 0–0)
NRBC # FLD: 0 K/UL — SIGNIFICANT CHANGE UP (ref 0–0)
NRBC # FLD: 0 K/UL — SIGNIFICANT CHANGE UP (ref 0–0)
NRBC BLD AUTO-RTO: 0 /100 WBCS — SIGNIFICANT CHANGE UP (ref 0–0)
NRBC BLD AUTO-RTO: 0 /100 WBCS — SIGNIFICANT CHANGE UP (ref 0–0)
PHOSPHATE SERPL-MCNC: 3.2 MG/DL — SIGNIFICANT CHANGE UP (ref 2.5–4.5)
PLATELET # BLD AUTO: 399 K/UL — SIGNIFICANT CHANGE UP (ref 150–400)
PLATELET # BLD AUTO: 400 K/UL — SIGNIFICANT CHANGE UP (ref 150–400)
PMV BLD: 10.3 FL — SIGNIFICANT CHANGE UP (ref 7–13)
PMV BLD: 10.5 FL — SIGNIFICANT CHANGE UP (ref 7–13)
POTASSIUM SERPL-MCNC: 3.6 MMOL/L — SIGNIFICANT CHANGE UP (ref 3.5–5.3)
POTASSIUM SERPL-SCNC: 3.6 MMOL/L — SIGNIFICANT CHANGE UP (ref 3.5–5.3)
PROT SERPL-MCNC: 5.7 G/DL — LOW (ref 6–8.3)
RBC # BLD: 3.38 M/UL — LOW (ref 3.8–5.2)
RBC # BLD: 3.45 M/UL — LOW (ref 3.8–5.2)
RBC # FLD: 17.3 % — HIGH (ref 10.3–14.5)
RBC # FLD: 17.6 % — HIGH (ref 10.3–14.5)
SODIUM SERPL-SCNC: 140 MMOL/L — SIGNIFICANT CHANGE UP (ref 135–145)
WBC # BLD: 8.54 K/UL — SIGNIFICANT CHANGE UP (ref 3.8–10.5)
WBC # BLD: 9.87 K/UL — SIGNIFICANT CHANGE UP (ref 3.8–10.5)
WBC # FLD AUTO: 8.54 K/UL — SIGNIFICANT CHANGE UP (ref 3.8–10.5)
WBC # FLD AUTO: 9.87 K/UL — SIGNIFICANT CHANGE UP (ref 3.8–10.5)

## 2025-06-20 PROCEDURE — 99233 SBSQ HOSP IP/OBS HIGH 50: CPT | Mod: GC

## 2025-06-20 PROCEDURE — 99232 SBSQ HOSP IP/OBS MODERATE 35: CPT

## 2025-06-20 PROCEDURE — 47531 INJECTION FOR CHOLANGIOGRAM: CPT

## 2025-06-20 RX ORDER — AMLODIPINE BESYLATE 10 MG/1
1 TABLET ORAL
Qty: 30 | Refills: 0
Start: 2025-06-20

## 2025-06-20 RX ORDER — MAGNESIUM SULFATE 500 MG/ML
2 SYRINGE (ML) INJECTION ONCE
Refills: 0 | Status: COMPLETED | OUTPATIENT
Start: 2025-06-20 | End: 2025-06-20

## 2025-06-20 RX ADMIN — METOPROLOL SUCCINATE 25 MILLIGRAM(S): 50 TABLET, EXTENDED RELEASE ORAL at 05:07

## 2025-06-20 RX ADMIN — ATORVASTATIN CALCIUM 40 MILLIGRAM(S): 80 TABLET, FILM COATED ORAL at 21:37

## 2025-06-20 RX ADMIN — Medication 1000 UNIT(S): at 11:47

## 2025-06-20 RX ADMIN — AMLODIPINE BESYLATE 5 MILLIGRAM(S): 10 TABLET ORAL at 05:08

## 2025-06-20 RX ADMIN — SODIUM CHLORIDE 50 MILLILITER(S): 9 INJECTION, SOLUTION INTRAVENOUS at 06:20

## 2025-06-20 RX ADMIN — INSULIN LISPRO 1: 100 INJECTION, SOLUTION INTRAVENOUS; SUBCUTANEOUS at 11:46

## 2025-06-20 RX ADMIN — METOPROLOL SUCCINATE 25 MILLIGRAM(S): 50 TABLET, EXTENDED RELEASE ORAL at 17:19

## 2025-06-20 RX ADMIN — Medication 25 GRAM(S): at 08:56

## 2025-06-20 RX ADMIN — INSULIN LISPRO 2: 100 INJECTION, SOLUTION INTRAVENOUS; SUBCUTANEOUS at 17:20

## 2025-06-20 RX ADMIN — Medication 40 MILLIGRAM(S): at 05:29

## 2025-06-20 RX ADMIN — INSULIN LISPRO 1: 100 INJECTION, SOLUTION INTRAVENOUS; SUBCUTANEOUS at 08:11

## 2025-06-20 RX ADMIN — Medication 81 MILLIGRAM(S): at 11:47

## 2025-06-20 RX ADMIN — CLOPIDOGREL BISULFATE 75 MILLIGRAM(S): 75 TABLET, FILM COATED ORAL at 11:47

## 2025-06-20 RX ADMIN — Medication 1 APPLICATION(S): at 11:46

## 2025-06-20 RX ADMIN — SERTRALINE 100 MILLIGRAM(S): 100 TABLET, FILM COATED ORAL at 11:46

## 2025-06-21 VITALS
DIASTOLIC BLOOD PRESSURE: 68 MMHG | RESPIRATION RATE: 17 BRPM | TEMPERATURE: 98 F | OXYGEN SATURATION: 98 % | SYSTOLIC BLOOD PRESSURE: 153 MMHG | HEART RATE: 60 BPM

## 2025-06-21 LAB
ALBUMIN SERPL ELPH-MCNC: 3.3 G/DL — SIGNIFICANT CHANGE UP (ref 3.3–5)
ALP SERPL-CCNC: 84 U/L — SIGNIFICANT CHANGE UP (ref 40–120)
ALT FLD-CCNC: 5 U/L — SIGNIFICANT CHANGE UP (ref 4–33)
ANION GAP SERPL CALC-SCNC: 12 MMOL/L — SIGNIFICANT CHANGE UP (ref 7–14)
AST SERPL-CCNC: 10 U/L — SIGNIFICANT CHANGE UP (ref 4–32)
BASOPHILS # BLD AUTO: 0.11 K/UL — SIGNIFICANT CHANGE UP (ref 0–0.2)
BASOPHILS NFR BLD AUTO: 1.3 % — SIGNIFICANT CHANGE UP (ref 0–2)
BILIRUB SERPL-MCNC: 0.2 MG/DL — SIGNIFICANT CHANGE UP (ref 0.2–1.2)
BLD GP AB SCN SERPL QL: NEGATIVE — SIGNIFICANT CHANGE UP
BUN SERPL-MCNC: 15 MG/DL — SIGNIFICANT CHANGE UP (ref 7–23)
CALCIUM SERPL-MCNC: 9.3 MG/DL — SIGNIFICANT CHANGE UP (ref 8.4–10.5)
CHLORIDE SERPL-SCNC: 104 MMOL/L — SIGNIFICANT CHANGE UP (ref 98–107)
CO2 SERPL-SCNC: 25 MMOL/L — SIGNIFICANT CHANGE UP (ref 22–31)
CREAT SERPL-MCNC: 0.65 MG/DL — SIGNIFICANT CHANGE UP (ref 0.5–1.3)
EGFR: 91 ML/MIN/1.73M2 — SIGNIFICANT CHANGE UP
EGFR: 91 ML/MIN/1.73M2 — SIGNIFICANT CHANGE UP
EOSINOPHIL # BLD AUTO: 0.41 K/UL — SIGNIFICANT CHANGE UP (ref 0–0.5)
EOSINOPHIL NFR BLD AUTO: 4.8 % — SIGNIFICANT CHANGE UP (ref 0–6)
GLUCOSE SERPL-MCNC: 159 MG/DL — HIGH (ref 70–99)
HCT VFR BLD CALC: 28.7 % — LOW (ref 34.5–45)
HGB BLD-MCNC: 9 G/DL — LOW (ref 11.5–15.5)
IMM GRANULOCYTES # BLD AUTO: 0.05 K/UL — SIGNIFICANT CHANGE UP (ref 0–0.07)
IMM GRANULOCYTES NFR BLD AUTO: 0.6 % — SIGNIFICANT CHANGE UP (ref 0–0.9)
LYMPHOCYTES # BLD AUTO: 1.91 K/UL — SIGNIFICANT CHANGE UP (ref 1–3.3)
LYMPHOCYTES NFR BLD AUTO: 22.6 % — SIGNIFICANT CHANGE UP (ref 13–44)
MAGNESIUM SERPL-MCNC: 1.6 MG/DL — SIGNIFICANT CHANGE UP (ref 1.6–2.6)
MCHC RBC-ENTMCNC: 25.6 PG — LOW (ref 27–34)
MCHC RBC-ENTMCNC: 31.4 G/DL — LOW (ref 32–36)
MCV RBC AUTO: 81.5 FL — SIGNIFICANT CHANGE UP (ref 80–100)
MONOCYTES # BLD AUTO: 0.7 K/UL — SIGNIFICANT CHANGE UP (ref 0–0.9)
MONOCYTES NFR BLD AUTO: 8.3 % — SIGNIFICANT CHANGE UP (ref 2–14)
NEUTROPHILS # BLD AUTO: 5.29 K/UL — SIGNIFICANT CHANGE UP (ref 1.8–7.4)
NEUTROPHILS NFR BLD AUTO: 62.4 % — SIGNIFICANT CHANGE UP (ref 43–77)
NRBC # BLD AUTO: 0 K/UL — SIGNIFICANT CHANGE UP (ref 0–0)
NRBC # FLD: 0 K/UL — SIGNIFICANT CHANGE UP (ref 0–0)
NRBC BLD AUTO-RTO: 0 /100 WBCS — SIGNIFICANT CHANGE UP (ref 0–0)
PHOSPHATE SERPL-MCNC: 4.3 MG/DL — SIGNIFICANT CHANGE UP (ref 2.5–4.5)
PLATELET # BLD AUTO: 385 K/UL — SIGNIFICANT CHANGE UP (ref 150–400)
PMV BLD: 10.6 FL — SIGNIFICANT CHANGE UP (ref 7–13)
POTASSIUM SERPL-MCNC: 3.6 MMOL/L — SIGNIFICANT CHANGE UP (ref 3.5–5.3)
POTASSIUM SERPL-SCNC: 3.6 MMOL/L — SIGNIFICANT CHANGE UP (ref 3.5–5.3)
PROT SERPL-MCNC: 6.2 G/DL — SIGNIFICANT CHANGE UP (ref 6–8.3)
RBC # BLD: 3.52 M/UL — LOW (ref 3.8–5.2)
RBC # FLD: 18 % — HIGH (ref 10.3–14.5)
RH IG SCN BLD-IMP: POSITIVE — SIGNIFICANT CHANGE UP
SODIUM SERPL-SCNC: 141 MMOL/L — SIGNIFICANT CHANGE UP (ref 135–145)
WBC # BLD: 8.47 K/UL — SIGNIFICANT CHANGE UP (ref 3.8–10.5)
WBC # FLD AUTO: 8.47 K/UL — SIGNIFICANT CHANGE UP (ref 3.8–10.5)

## 2025-06-21 PROCEDURE — 99239 HOSP IP/OBS DSCHRG MGMT >30: CPT

## 2025-06-21 RX ADMIN — Medication 1 APPLICATION(S): at 11:50

## 2025-06-21 RX ADMIN — Medication 40 MILLIGRAM(S): at 05:35

## 2025-06-21 RX ADMIN — AMLODIPINE BESYLATE 5 MILLIGRAM(S): 10 TABLET ORAL at 05:34

## 2025-06-21 RX ADMIN — Medication 1000 UNIT(S): at 11:50

## 2025-06-21 RX ADMIN — SERTRALINE 100 MILLIGRAM(S): 100 TABLET, FILM COATED ORAL at 11:51

## 2025-06-21 RX ADMIN — INSULIN LISPRO 1: 100 INJECTION, SOLUTION INTRAVENOUS; SUBCUTANEOUS at 07:46

## 2025-06-21 RX ADMIN — METOPROLOL SUCCINATE 25 MILLIGRAM(S): 50 TABLET, EXTENDED RELEASE ORAL at 11:50

## 2025-06-21 RX ADMIN — Medication 81 MILLIGRAM(S): at 11:50

## 2025-06-21 RX ADMIN — INSULIN LISPRO 1: 100 INJECTION, SOLUTION INTRAVENOUS; SUBCUTANEOUS at 11:51

## 2025-06-21 RX ADMIN — CLOPIDOGREL BISULFATE 75 MILLIGRAM(S): 75 TABLET, FILM COATED ORAL at 11:50

## 2025-06-26 ENCOUNTER — TRANSCRIPTION ENCOUNTER (OUTPATIENT)
Age: 75
End: 2025-06-26

## 2025-06-29 ENCOUNTER — OUTPATIENT (OUTPATIENT)
Dept: OUTPATIENT SERVICES | Facility: HOSPITAL | Age: 75
LOS: 1 days | Discharge: HOME | End: 2025-06-29

## 2025-06-29 DIAGNOSIS — Z98.890 OTHER SPECIFIED POSTPROCEDURAL STATES: Chronic | ICD-10-CM

## 2025-06-29 DIAGNOSIS — Z98.89 OTHER SPECIFIED POSTPROCEDURAL STATES: Chronic | ICD-10-CM

## 2025-06-29 DIAGNOSIS — Z98.41 CATARACT EXTRACTION STATUS, RIGHT EYE: Chronic | ICD-10-CM

## 2025-06-29 DIAGNOSIS — D64.9 ANEMIA, UNSPECIFIED: ICD-10-CM

## 2025-06-30 ENCOUNTER — NON-APPOINTMENT (OUTPATIENT)
Age: 75
End: 2025-06-30

## 2025-07-02 ENCOUNTER — RESULT REVIEW (OUTPATIENT)
Age: 75
End: 2025-07-02

## 2025-07-02 ENCOUNTER — NON-APPOINTMENT (OUTPATIENT)
Age: 75
End: 2025-07-02

## 2025-07-02 ENCOUNTER — LABORATORY RESULT (OUTPATIENT)
Age: 75
End: 2025-07-02

## 2025-07-02 ENCOUNTER — APPOINTMENT (OUTPATIENT)
Dept: HEMATOLOGY ONCOLOGY | Facility: CLINIC | Age: 75
End: 2025-07-02
Payer: MEDICARE

## 2025-07-02 VITALS
TEMPERATURE: 98 F | HEIGHT: 62 IN | WEIGHT: 126.76 LBS | RESPIRATION RATE: 17 BRPM | SYSTOLIC BLOOD PRESSURE: 138 MMHG | DIASTOLIC BLOOD PRESSURE: 60 MMHG | BODY MASS INDEX: 23.33 KG/M2 | HEART RATE: 81 BPM | OXYGEN SATURATION: 98 %

## 2025-07-02 LAB
ALBUMIN SERPL ELPH-MCNC: 4 G/DL
ALP BLD-CCNC: 91 U/L
ALT SERPL-CCNC: 13 U/L
ANION GAP SERPL CALC-SCNC: 17 MMOL/L
ANISOCYTOSIS BLD QL: SLIGHT — SIGNIFICANT CHANGE UP
AST SERPL-CCNC: 14 U/L
BASOPHILS # BLD AUTO: 0.11 K/UL — SIGNIFICANT CHANGE UP (ref 0–0.2)
BASOPHILS NFR BLD AUTO: 1 % — SIGNIFICANT CHANGE UP (ref 0–2)
BILIRUB SERPL-MCNC: 0.2 MG/DL
BUN SERPL-MCNC: 21 MG/DL
CALCIUM SERPL-MCNC: 10.5 MG/DL
CHLORIDE SERPL-SCNC: 103 MMOL/L
CO2 SERPL-SCNC: 24 MMOL/L
CREAT SERPL-MCNC: 0.81 MG/DL
EGFRCR SERPLBLD CKD-EPI 2021: 75 ML/MIN/1.73M2
ELLIPTOCYTES BLD QL SMEAR: SLIGHT — SIGNIFICANT CHANGE UP
EOSINOPHIL # BLD AUTO: 0.45 K/UL — SIGNIFICANT CHANGE UP (ref 0–0.5)
EOSINOPHIL NFR BLD AUTO: 4.1 % — SIGNIFICANT CHANGE UP (ref 0–6)
FERRITIN SERPL-MCNC: 18 NG/ML
FOLATE SERPL-MCNC: 7.6 NG/ML
GLUCOSE SERPL-MCNC: 136 MG/DL
HAPTOGLOB SERPL-MCNC: 331 MG/DL
HCT VFR BLD CALC: 29.2 % — LOW (ref 34.5–45)
HGB BLD-MCNC: 8.9 G/DL — LOW (ref 11.5–15.5)
HYPOCHROMIA BLD QL: SLIGHT — SIGNIFICANT CHANGE UP
IMM GRANULOCYTES NFR BLD AUTO: 0.5 % — SIGNIFICANT CHANGE UP (ref 0–0.9)
IRON SATN MFR SERPL: 10 %
IRON SERPL-MCNC: 33 UG/DL
LDH SERPL-CCNC: 156 U/L
LYMPHOCYTES # BLD AUTO: 2.46 K/UL — SIGNIFICANT CHANGE UP (ref 1–3.3)
LYMPHOCYTES # BLD AUTO: 22.2 % — SIGNIFICANT CHANGE UP (ref 13–44)
MCHC RBC-ENTMCNC: 25.1 PG — LOW (ref 27–34)
MCHC RBC-ENTMCNC: 30.5 G/DL — LOW (ref 32–36)
MCV RBC AUTO: 82.3 FL — SIGNIFICANT CHANGE UP (ref 80–100)
MONOCYTES # BLD AUTO: 0.78 K/UL — SIGNIFICANT CHANGE UP (ref 0–0.9)
MONOCYTES NFR BLD AUTO: 7 % — SIGNIFICANT CHANGE UP (ref 2–14)
NEUTROPHILS # BLD AUTO: 7.25 K/UL — SIGNIFICANT CHANGE UP (ref 1.8–7.4)
NEUTROPHILS NFR BLD AUTO: 65.2 % — SIGNIFICANT CHANGE UP (ref 43–77)
NRBC BLD AUTO-RTO: 0 /100 WBCS — SIGNIFICANT CHANGE UP (ref 0–0)
PLAT MORPH BLD: NORMAL — SIGNIFICANT CHANGE UP
PLATELET # BLD AUTO: 438 K/UL — HIGH (ref 150–400)
POIKILOCYTOSIS BLD QL AUTO: SLIGHT — SIGNIFICANT CHANGE UP
POTASSIUM SERPL-SCNC: 4.4 MMOL/L
PROT SERPL-MCNC: 6.7 G/DL
RBC # BLD: 3.55 M/UL — LOW (ref 3.8–5.2)
RBC # FLD: 17.8 % — HIGH (ref 10.3–14.5)
RBC BLD AUTO: ABNORMAL
RETICS #: 45.8 K/UL — SIGNIFICANT CHANGE UP (ref 25–125)
RETICS/RBC NFR: 1.3 % — SIGNIFICANT CHANGE UP (ref 0.5–2.5)
SCHISTOCYTES BLD QL AUTO: SLIGHT — SIGNIFICANT CHANGE UP
SODIUM SERPL-SCNC: 144 MMOL/L
TIBC SERPL-MCNC: 350 UG/DL
UIBC SERPL-MCNC: 317 UG/DL
VIT B12 SERPL-MCNC: 426 PG/ML
WBC # BLD: 11.1 K/UL — HIGH (ref 3.8–10.5)
WBC # FLD AUTO: 11.1 K/UL — HIGH (ref 3.8–10.5)

## 2025-07-02 PROCEDURE — 99204 OFFICE O/P NEW MOD 45 MIN: CPT

## 2025-07-02 RX ORDER — LOSARTAN POTASSIUM 100 MG/1
100 TABLET, FILM COATED ORAL DAILY
Refills: 0 | Status: ACTIVE | COMMUNITY

## 2025-07-02 RX ORDER — CLOPIDOGREL 75 MG/1
75 TABLET, FILM COATED ORAL DAILY
Refills: 0 | Status: ACTIVE | COMMUNITY

## 2025-07-02 RX ORDER — AMLODIPINE BESYLATE 5 MG/1
5 TABLET ORAL DAILY
Refills: 0 | Status: ACTIVE | COMMUNITY

## 2025-07-02 RX ORDER — HYDROCHLOROTHIAZIDE 25 MG/1
25 TABLET ORAL DAILY
Refills: 0 | Status: ACTIVE | COMMUNITY

## 2025-07-02 RX ORDER — ATORVASTATIN CALCIUM 40 MG/1
40 TABLET, FILM COATED ORAL DAILY
Refills: 0 | Status: ACTIVE | COMMUNITY

## 2025-07-02 RX ORDER — METFORMIN HYDROCHLORIDE 1000 MG/1
1000 TABLET, FILM COATED, EXTENDED RELEASE ORAL
Refills: 0 | Status: ACTIVE | COMMUNITY

## 2025-07-02 RX ORDER — VIT A/VIT C/VIT E/ZINC/COPPER 2148-113
TABLET ORAL
Refills: 0 | Status: ACTIVE | COMMUNITY

## 2025-07-02 RX ORDER — METOPROLOL SUCCINATE 50 MG/1
50 TABLET, EXTENDED RELEASE ORAL DAILY
Refills: 0 | Status: ACTIVE | COMMUNITY

## 2025-07-02 RX ORDER — PANTOPRAZOLE SODIUM 40 MG/1
40 TABLET, DELAYED RELEASE ORAL DAILY
Refills: 0 | Status: ACTIVE | COMMUNITY

## 2025-07-07 ENCOUNTER — APPOINTMENT (OUTPATIENT)
Dept: INFUSION THERAPY | Facility: HOSPITAL | Age: 75
End: 2025-07-07

## 2025-07-07 LAB
ALBUMIN MFR SERPL ELPH: 52.4 %
ALBUMIN SERPL-MCNC: 3.5 G/DL
ALBUMIN/GLOB SERPL: 1.1 RATIO
ALPHA1 GLOB MFR SERPL ELPH: 5.4 %
ALPHA1 GLOB SERPL ELPH-MCNC: 0.4 G/DL
ALPHA2 GLOB MFR SERPL ELPH: 13.5 %
ALPHA2 GLOB SERPL ELPH-MCNC: 0.9 G/DL
B-GLOBULIN MFR SERPL ELPH: 13.7 %
B-GLOBULIN SERPL ELPH-MCNC: 0.9 G/DL
DEPRECATED KAPPA LC FREE/LAMBDA SER: 1.03 RATIO
GAMMA GLOB FLD ELPH-MCNC: 1 G/DL
GAMMA GLOB MFR SERPL ELPH: 15 %
IGA SERPL-MCNC: 257 MG/DL
IGG SERPL-MCNC: 922 MG/DL
IGM SERPL-MCNC: 181 MG/DL
INTERPRETATION SERPL IEP-IMP: NORMAL
KAPPA LC CSF-MCNC: 2.4 MG/DL
KAPPA LC SERPL-MCNC: 2.47 MG/DL
M PROTEIN MFR SERPL ELPH: NORMAL
M PROTEIN SPEC IFE-MCNC: NORMAL
METHYLMALONATE SERPL-SCNC: 273 NMOL/L
MONOCLON BAND OBS SERPL: NORMAL
PROT SERPL-MCNC: 6.7 G/DL
PROT SERPL-MCNC: 6.7 G/DL

## 2025-07-08 DIAGNOSIS — D50.9 IRON DEFICIENCY ANEMIA, UNSPECIFIED: ICD-10-CM

## 2025-07-14 ENCOUNTER — APPOINTMENT (OUTPATIENT)
Dept: INFUSION THERAPY | Facility: HOSPITAL | Age: 75
End: 2025-07-14

## 2025-07-15 ENCOUNTER — NON-APPOINTMENT (OUTPATIENT)
Age: 75
End: 2025-07-15

## 2025-07-18 ENCOUNTER — APPOINTMENT (OUTPATIENT)
Dept: TRAUMA SURGERY | Facility: HOSPITAL | Age: 75
End: 2025-07-18

## 2025-07-18 VITALS
HEART RATE: 80 BPM | BODY MASS INDEX: 22.82 KG/M2 | DIASTOLIC BLOOD PRESSURE: 59 MMHG | WEIGHT: 124 LBS | SYSTOLIC BLOOD PRESSURE: 138 MMHG | TEMPERATURE: 97.9 F | HEIGHT: 62 IN

## 2025-07-22 ENCOUNTER — NON-APPOINTMENT (OUTPATIENT)
Age: 75
End: 2025-07-22

## 2025-07-28 ENCOUNTER — RESULT REVIEW (OUTPATIENT)
Age: 75
End: 2025-07-28

## 2025-07-28 ENCOUNTER — OUTPATIENT (OUTPATIENT)
Dept: OUTPATIENT SERVICES | Facility: HOSPITAL | Age: 75
LOS: 1 days | End: 2025-07-28
Payer: MEDICARE

## 2025-07-28 VITALS
RESPIRATION RATE: 20 BRPM | HEART RATE: 64 BPM | OXYGEN SATURATION: 98 % | SYSTOLIC BLOOD PRESSURE: 162 MMHG | DIASTOLIC BLOOD PRESSURE: 62 MMHG

## 2025-07-28 VITALS
SYSTOLIC BLOOD PRESSURE: 177 MMHG | RESPIRATION RATE: 20 BRPM | DIASTOLIC BLOOD PRESSURE: 59 MMHG | HEART RATE: 67 BPM | OXYGEN SATURATION: 98 % | TEMPERATURE: 97 F

## 2025-07-28 DIAGNOSIS — K81.9 CHOLECYSTITIS, UNSPECIFIED: ICD-10-CM

## 2025-07-28 DIAGNOSIS — Z98.890 OTHER SPECIFIED POSTPROCEDURAL STATES: Chronic | ICD-10-CM

## 2025-07-28 DIAGNOSIS — Z98.89 OTHER SPECIFIED POSTPROCEDURAL STATES: Chronic | ICD-10-CM

## 2025-07-28 DIAGNOSIS — Z98.41 CATARACT EXTRACTION STATUS, RIGHT EYE: Chronic | ICD-10-CM

## 2025-07-28 PROCEDURE — 47531 INJECTION FOR CHOLANGIOGRAM: CPT

## 2025-07-31 DIAGNOSIS — K81.0 ACUTE CHOLECYSTITIS: ICD-10-CM

## 2025-07-31 DIAGNOSIS — Z46.59 ENCOUNTER FOR FITTING AND ADJUSTMENT OF OTHER GASTROINTESTINAL APPLIANCE AND DEVICE: ICD-10-CM

## 2025-08-08 ENCOUNTER — OUTPATIENT (OUTPATIENT)
Dept: OUTPATIENT SERVICES | Facility: HOSPITAL | Age: 75
LOS: 1 days | End: 2025-08-08

## 2025-08-08 VITALS
TEMPERATURE: 98 F | SYSTOLIC BLOOD PRESSURE: 139 MMHG | HEIGHT: 61 IN | DIASTOLIC BLOOD PRESSURE: 67 MMHG | HEART RATE: 65 BPM | OXYGEN SATURATION: 97 % | WEIGHT: 125 LBS | RESPIRATION RATE: 16 BRPM

## 2025-08-08 DIAGNOSIS — I73.9 PERIPHERAL VASCULAR DISEASE, UNSPECIFIED: ICD-10-CM

## 2025-08-08 DIAGNOSIS — K81.0 ACUTE CHOLECYSTITIS: ICD-10-CM

## 2025-08-08 DIAGNOSIS — Z98.890 OTHER SPECIFIED POSTPROCEDURAL STATES: Chronic | ICD-10-CM

## 2025-08-08 DIAGNOSIS — G47.33 OBSTRUCTIVE SLEEP APNEA (ADULT) (PEDIATRIC): ICD-10-CM

## 2025-08-08 DIAGNOSIS — E11.9 TYPE 2 DIABETES MELLITUS WITHOUT COMPLICATIONS: ICD-10-CM

## 2025-08-08 DIAGNOSIS — Z98.89 OTHER SPECIFIED POSTPROCEDURAL STATES: Chronic | ICD-10-CM

## 2025-08-08 DIAGNOSIS — I10 ESSENTIAL (PRIMARY) HYPERTENSION: ICD-10-CM

## 2025-08-08 DIAGNOSIS — Z98.41 CATARACT EXTRACTION STATUS, RIGHT EYE: Chronic | ICD-10-CM

## 2025-08-08 DIAGNOSIS — Z95.820 PERIPHERAL VASCULAR ANGIOPLASTY STATUS WITH IMPLANTS AND GRAFTS: Chronic | ICD-10-CM

## 2025-08-08 DIAGNOSIS — Z87.19 PERSONAL HISTORY OF OTHER DISEASES OF THE DIGESTIVE SYSTEM: ICD-10-CM

## 2025-08-08 DIAGNOSIS — K21.9 GASTRO-ESOPHAGEAL REFLUX DISEASE WITHOUT ESOPHAGITIS: ICD-10-CM

## 2025-08-08 LAB
HCT VFR BLD CALC: 38.5 % — SIGNIFICANT CHANGE UP (ref 34.5–45)
HGB BLD-MCNC: 12.1 G/DL — SIGNIFICANT CHANGE UP (ref 11.5–15.5)
MCHC RBC-ENTMCNC: 27.4 PG — SIGNIFICANT CHANGE UP (ref 27–34)
MCHC RBC-ENTMCNC: 31.4 G/DL — LOW (ref 32–36)
MCV RBC AUTO: 87.3 FL — SIGNIFICANT CHANGE UP (ref 80–100)
NRBC # BLD AUTO: 0 K/UL — SIGNIFICANT CHANGE UP (ref 0–0)
NRBC # FLD: 0 K/UL — SIGNIFICANT CHANGE UP (ref 0–0)
NRBC BLD AUTO-RTO: 0 /100 WBCS — SIGNIFICANT CHANGE UP (ref 0–0)
PLATELET # BLD AUTO: 303 K/UL — SIGNIFICANT CHANGE UP (ref 150–400)
PMV BLD: 11.6 FL — SIGNIFICANT CHANGE UP (ref 7–13)
RBC # BLD: 4.41 M/UL — SIGNIFICANT CHANGE UP (ref 3.8–5.2)
RBC # FLD: 21.4 % — HIGH (ref 10.3–14.5)
WBC # BLD: 11.07 K/UL — HIGH (ref 3.8–10.5)
WBC # FLD AUTO: 11.07 K/UL — HIGH (ref 3.8–10.5)

## 2025-08-08 RX ORDER — SODIUM CHLORIDE 9 G/1000ML
1000 INJECTION, SOLUTION INTRAVENOUS
Refills: 0 | Status: DISCONTINUED | OUTPATIENT
Start: 2025-08-13 | End: 2025-08-27

## 2025-08-11 PROBLEM — I73.9 PERIPHERAL VASCULAR DISEASE, UNSPECIFIED: Chronic | Status: ACTIVE | Noted: 2025-08-08

## 2025-08-11 PROBLEM — Z87.19 PERSONAL HISTORY OF OTHER DISEASES OF THE DIGESTIVE SYSTEM: Chronic | Status: ACTIVE | Noted: 2025-08-08

## 2025-08-11 PROBLEM — G47.33 OBSTRUCTIVE SLEEP APNEA (ADULT) (PEDIATRIC): Chronic | Status: ACTIVE | Noted: 2025-08-08

## 2025-08-11 PROBLEM — K21.9 GASTRO-ESOPHAGEAL REFLUX DISEASE WITHOUT ESOPHAGITIS: Chronic | Status: ACTIVE | Noted: 2025-08-08

## 2025-08-11 PROBLEM — D50.9 IRON DEFICIENCY ANEMIA, UNSPECIFIED: Chronic | Status: ACTIVE | Noted: 2025-08-08

## 2025-08-12 ENCOUNTER — RESULT REVIEW (OUTPATIENT)
Age: 75
End: 2025-08-12

## 2025-08-12 ENCOUNTER — APPOINTMENT (OUTPATIENT)
Dept: HEMATOLOGY ONCOLOGY | Facility: CLINIC | Age: 75
End: 2025-08-12
Payer: MEDICARE

## 2025-08-12 VITALS
WEIGHT: 126.87 LBS | BODY MASS INDEX: 23.35 KG/M2 | DIASTOLIC BLOOD PRESSURE: 58 MMHG | OXYGEN SATURATION: 98 % | HEART RATE: 79 BPM | RESPIRATION RATE: 17 BRPM | TEMPERATURE: 97.4 F | HEIGHT: 62 IN | SYSTOLIC BLOOD PRESSURE: 152 MMHG

## 2025-08-12 DIAGNOSIS — D64.9 ANEMIA, UNSPECIFIED: ICD-10-CM

## 2025-08-12 DIAGNOSIS — D47.2 MONOCLONAL GAMMOPATHY: ICD-10-CM

## 2025-08-12 LAB
ALBUMIN SERPL ELPH-MCNC: 4 G/DL
ALP BLD-CCNC: 96 U/L
ALT SERPL-CCNC: 11 U/L
ANION GAP SERPL CALC-SCNC: 17 MMOL/L
AST SERPL-CCNC: 14 U/L
BILIRUB SERPL-MCNC: 0.2 MG/DL
BUN SERPL-MCNC: 17 MG/DL
CALCIUM SERPL-MCNC: 10.1 MG/DL
CHLORIDE SERPL-SCNC: 101 MMOL/L
CO2 SERPL-SCNC: 24 MMOL/L
CREAT SERPL-MCNC: 0.75 MG/DL
EGFRCR SERPLBLD CKD-EPI 2021: 82 ML/MIN/1.73M2
FERRITIN SERPL-MCNC: 115 NG/ML
GLUCOSE SERPL-MCNC: 197 MG/DL
IRON SATN MFR SERPL: 21 %
IRON SERPL-MCNC: 57 UG/DL
POTASSIUM SERPL-SCNC: 4.9 MMOL/L
PROT SERPL-MCNC: 6.7 G/DL
RETICS #: 70 K/UL — SIGNIFICANT CHANGE UP (ref 25–125)
RETICS/RBC NFR: 1.5 % — SIGNIFICANT CHANGE UP (ref 0.5–2.5)
SODIUM SERPL-SCNC: 143 MMOL/L
TIBC SERPL-MCNC: 266 UG/DL
UIBC SERPL-MCNC: 209 UG/DL

## 2025-08-12 PROCEDURE — 99213 OFFICE O/P EST LOW 20 MIN: CPT

## 2025-08-13 ENCOUNTER — APPOINTMENT (OUTPATIENT)
Dept: TRAUMA SURGERY | Facility: HOSPITAL | Age: 75
End: 2025-08-13

## 2025-08-13 ENCOUNTER — RESULT REVIEW (OUTPATIENT)
Age: 75
End: 2025-08-13

## 2025-08-13 ENCOUNTER — TRANSCRIPTION ENCOUNTER (OUTPATIENT)
Age: 75
End: 2025-08-13

## 2025-08-13 ENCOUNTER — OUTPATIENT (OUTPATIENT)
Dept: INPATIENT UNIT | Facility: HOSPITAL | Age: 75
LOS: 1 days | End: 2025-08-13
Payer: MEDICARE

## 2025-08-13 VITALS
RESPIRATION RATE: 16 BRPM | HEART RATE: 58 BPM | TEMPERATURE: 98 F | SYSTOLIC BLOOD PRESSURE: 145 MMHG | WEIGHT: 125 LBS | OXYGEN SATURATION: 99 % | DIASTOLIC BLOOD PRESSURE: 74 MMHG | HEIGHT: 62 IN

## 2025-08-13 VITALS
RESPIRATION RATE: 18 BRPM | SYSTOLIC BLOOD PRESSURE: 132 MMHG | HEART RATE: 64 BPM | DIASTOLIC BLOOD PRESSURE: 65 MMHG | TEMPERATURE: 98 F | OXYGEN SATURATION: 98 %

## 2025-08-13 DIAGNOSIS — Z98.890 OTHER SPECIFIED POSTPROCEDURAL STATES: Chronic | ICD-10-CM

## 2025-08-13 DIAGNOSIS — Z95.820 PERIPHERAL VASCULAR ANGIOPLASTY STATUS WITH IMPLANTS AND GRAFTS: Chronic | ICD-10-CM

## 2025-08-13 DIAGNOSIS — Z98.89 OTHER SPECIFIED POSTPROCEDURAL STATES: Chronic | ICD-10-CM

## 2025-08-13 DIAGNOSIS — K81.0 ACUTE CHOLECYSTITIS: ICD-10-CM

## 2025-08-13 DIAGNOSIS — Z98.41 CATARACT EXTRACTION STATUS, RIGHT EYE: Chronic | ICD-10-CM

## 2025-08-13 LAB — GLUCOSE BLDC GLUCOMTR-MCNC: 211 MG/DL — HIGH (ref 70–99)

## 2025-08-13 PROCEDURE — 88304 TISSUE EXAM BY PATHOLOGIST: CPT | Mod: 26

## 2025-08-13 PROCEDURE — 88307 TISSUE EXAM BY PATHOLOGIST: CPT | Mod: 26

## 2025-08-13 DEVICE — CLIP LIGATING VESOLOCK MED/LG GRN: Type: IMPLANTABLE DEVICE | Status: FUNCTIONAL

## 2025-08-13 DEVICE — ARISTA 3GR: Type: IMPLANTABLE DEVICE | Status: FUNCTIONAL

## 2025-08-13 DEVICE — LIGATING CLIPS WECK HEMOLOK POLYMER LARGE (PURPLE) 6: Type: IMPLANTABLE DEVICE | Status: FUNCTIONAL

## 2025-08-13 RX ORDER — ATORVASTATIN CALCIUM 80 MG/1
1 TABLET, FILM COATED ORAL
Refills: 0 | DISCHARGE

## 2025-08-13 RX ORDER — CYST/ALA/Q10/PHOS.SER/DHA/BROC 100-20-50
1 POWDER (GRAM) ORAL
Refills: 0 | DISCHARGE

## 2025-08-13 RX ORDER — METOPROLOL SUCCINATE 50 MG/1
1 TABLET, EXTENDED RELEASE ORAL
Refills: 0 | DISCHARGE

## 2025-08-13 RX ORDER — CLOPIDOGREL BISULFATE 75 MG/1
1 TABLET, FILM COATED ORAL
Refills: 0 | DISCHARGE

## 2025-08-13 RX ORDER — AMLODIPINE BESYLATE 10 MG/1
1 TABLET ORAL
Refills: 0 | DISCHARGE

## 2025-08-13 RX ORDER — ASPIRIN 325 MG
81 TABLET ORAL DAILY
Refills: 0 | Status: DISCONTINUED | OUTPATIENT
Start: 2025-08-13 | End: 2025-08-27

## 2025-08-13 RX ORDER — OXYCODONE HYDROCHLORIDE 30 MG/1
1 TABLET ORAL
Qty: 8 | Refills: 0
Start: 2025-08-13

## 2025-08-13 RX ORDER — HYDROMORPHONE/SOD CHLOR,ISO/PF 2 MG/10 ML
0.5 SYRINGE (ML) INJECTION
Refills: 0 | Status: DISCONTINUED | OUTPATIENT
Start: 2025-08-13 | End: 2025-08-13

## 2025-08-13 RX ORDER — SERTRALINE 100 MG/1
1 TABLET, FILM COATED ORAL
Refills: 0 | DISCHARGE

## 2025-08-13 RX ORDER — ONDANSETRON HCL/PF 4 MG/2 ML
4 VIAL (ML) INJECTION ONCE
Refills: 0 | Status: DISCONTINUED | OUTPATIENT
Start: 2025-08-13 | End: 2025-08-27

## 2025-08-13 RX ORDER — ASPIRIN 325 MG
1 TABLET ORAL
Refills: 0 | DISCHARGE

## 2025-08-13 RX ORDER — LOSARTAN POTASSIUM 100 MG/1
1 TABLET, FILM COATED ORAL
Refills: 0 | DISCHARGE

## 2025-08-13 RX ORDER — HYDROMORPHONE/SOD CHLOR,ISO/PF 2 MG/10 ML
0.25 SYRINGE (ML) INJECTION
Refills: 0 | Status: DISCONTINUED | OUTPATIENT
Start: 2025-08-13 | End: 2025-08-13

## 2025-08-13 RX ORDER — HYDROCHLOROTHIAZIDE 50 MG/1
1 TABLET ORAL
Refills: 0 | DISCHARGE

## 2025-08-13 RX ORDER — METFORMIN HYDROCHLORIDE 850 MG/1
1 TABLET ORAL
Refills: 0 | DISCHARGE

## 2025-08-13 RX ADMIN — Medication 81 MILLIGRAM(S): at 11:14

## 2025-08-17 PROBLEM — D64.9 ANEMIA, UNSPECIFIED TYPE: Status: ACTIVE | Noted: 2025-07-02

## 2025-08-17 PROBLEM — D47.2: Status: ACTIVE | Noted: 2025-08-17

## 2025-08-19 LAB — SURGICAL PATHOLOGY STUDY: SIGNIFICANT CHANGE UP

## 2025-08-22 ENCOUNTER — APPOINTMENT (OUTPATIENT)
Dept: TRAUMA SURGERY | Facility: HOSPITAL | Age: 75
End: 2025-08-22

## 2025-08-22 VITALS
DIASTOLIC BLOOD PRESSURE: 54 MMHG | HEART RATE: 67 BPM | HEIGHT: 62 IN | TEMPERATURE: 98 F | BODY MASS INDEX: 23.74 KG/M2 | SYSTOLIC BLOOD PRESSURE: 130 MMHG | WEIGHT: 129 LBS

## 2025-09-17 ENCOUNTER — NON-APPOINTMENT (OUTPATIENT)
Age: 75
End: 2025-09-17

## (undated) DEVICE — TUBING STRYKEFLOW II SUCTION / IRRIGATOR

## (undated) DEVICE — WARMING BLANKET UPPER ADULT

## (undated) DEVICE — ENDOCATCH 10MM

## (undated) DEVICE — SOL IRR POUR NS 0.9% 500ML

## (undated) DEVICE — LUBRICATING JELLY HR ONE SHOT 3G

## (undated) DEVICE — UNDERPAD LINEN SAVER 17 X 24"

## (undated) DEVICE — XI ENDOWRIST 12 - 8 MM CANNULA REDUCER

## (undated) DEVICE — XI OBTURATOR OPTICAL BLADELESS 8MM

## (undated) DEVICE — Device

## (undated) DEVICE — ELCTR BOVIE PENCIL SMOKE EVACUATION

## (undated) DEVICE — DRSG CURITY GAUZE SPONGE 4 X 4" 12-PLY NON-STERILE

## (undated) DEVICE — SALIVA EJECTOR (BLUE)

## (undated) DEVICE — D HELP - CLEARVIEW CLEARIFY SYSTEM

## (undated) DEVICE — SUT VICRYL 0 27" UR-6

## (undated) DEVICE — VENODYNE/SCD SLEEVE CALF MEDIUM

## (undated) DEVICE — POSITIONER FOAM HEAD CRADLE (PINK)

## (undated) DEVICE — XI ARM CLIP APPLIER LARGE

## (undated) DEVICE — XI ARM FORCEP FENESTRATED BIPOLAR 8MM

## (undated) DEVICE — BITE BLOCK ADULT 20 X 27MM (GREEN)

## (undated) DEVICE — BIOPSY FORCEP COLD DISP

## (undated) DEVICE — PACK IV START WITH CHG

## (undated) DEVICE — ENDOCATCH 5MM INZII

## (undated) DEVICE — BLADE SCALPEL SAFETYLOCK #10

## (undated) DEVICE — XI SEAL UNIVERSIAL 5-12MM

## (undated) DEVICE — XI SCISSOR TIP COVER

## (undated) DEVICE — XI ARM CLIP APPLIER MEDIUM-LARGE

## (undated) DEVICE — XI 12MM AND STAPLER CANNULA SEAL

## (undated) DEVICE — DRAPE WARMING SOLUTION 44 X 44"

## (undated) DEVICE — TUBING SUCTION 20FT

## (undated) DEVICE — XI ARM FORCEP PROGRASP 8MM

## (undated) DEVICE — CATH IV SAFE BC 22G X 1" (BLUE)

## (undated) DEVICE — VENODYNE/SCD SLEEVE FOOT

## (undated) DEVICE — PREP CHLORAPREP HI-LITE ORANGE 26ML

## (undated) DEVICE — CLAMP BX HOT RAD JAW 3

## (undated) DEVICE — XI ARM SCISSOR POTTS

## (undated) DEVICE — XI ARM PERMANENT CAUTERY HOOK

## (undated) DEVICE — DRSG 2X2

## (undated) DEVICE — ELCTR ECG CONDUCTIVE ADHESIVE

## (undated) DEVICE — TUBING MEDI-VAC W MAXIGRIP CONNECTORS 1/4"X6'

## (undated) DEVICE — POSITIONER FOAM EGG CRATE ULNAR 2PCS (PINK)

## (undated) DEVICE — SUT MONOCRYL 4-0 27" PS-2 UNDYED

## (undated) DEVICE — BASIN EMESIS 10IN GRADUATED MAUVE

## (undated) DEVICE — CONTAINER FORMALIN 80ML YELLOW

## (undated) DEVICE — DRSG BANDAID 0.75X3"

## (undated) DEVICE — XI ARM SCISSOR MONO CURVED

## (undated) DEVICE — TUBING IV SET GRAVITY 3Y 100" MACRO

## (undated) DEVICE — WARMING BLANKET LOWER ADULT

## (undated) DEVICE — SOL IRR POUR H2O 250ML

## (undated) DEVICE — GOWN LG

## (undated) DEVICE — POSITIONER PURPLE ARM ONE STEP (LARGE)

## (undated) DEVICE — DENTURE CUP PINK

## (undated) DEVICE — PACK ROBOTIC

## (undated) DEVICE — BIOPSY FORCEP RADIAL JAW 4 STANDARD WITH NEEDLE